# Patient Record
Sex: FEMALE | Race: BLACK OR AFRICAN AMERICAN | NOT HISPANIC OR LATINO | URBAN - METROPOLITAN AREA
[De-identification: names, ages, dates, MRNs, and addresses within clinical notes are randomized per-mention and may not be internally consistent; named-entity substitution may affect disease eponyms.]

---

## 2023-02-16 PROBLEM — M54.50 LOW BACK PAIN: Status: ACTIVE | Noted: 2023-02-16

## 2023-02-16 PROBLEM — R15.9 INCONTINENCE OF FECES WITH FECAL URGENCY: Status: ACTIVE | Noted: 2023-02-16

## 2023-02-16 PROBLEM — E11.9 DIABETES MELLITUS (MULTI): Status: ACTIVE | Noted: 2023-02-16

## 2023-02-16 PROBLEM — K59.00 CONSTIPATION: Status: ACTIVE | Noted: 2023-02-16

## 2023-02-16 PROBLEM — E55.9 VITAMIN D DEFICIENCY: Status: ACTIVE | Noted: 2023-02-16

## 2023-02-16 PROBLEM — R46.89 BEHAVIOR CONCERN: Status: ACTIVE | Noted: 2023-02-16

## 2023-02-16 PROBLEM — G35 MULTIPLE SCLEROSIS (MULTI): Status: ACTIVE | Noted: 2023-02-16

## 2023-02-16 PROBLEM — R06.83 SNORING: Status: ACTIVE | Noted: 2023-02-16

## 2023-02-16 PROBLEM — M79.605 PAIN OF LEFT LOWER EXTREMITY: Status: ACTIVE | Noted: 2023-02-16

## 2023-02-16 PROBLEM — E78.5 DYSLIPIDEMIA: Status: ACTIVE | Noted: 2023-02-16

## 2023-02-16 PROBLEM — E11.9 TYPE II DIABETES MELLITUS (MULTI): Status: ACTIVE | Noted: 2023-02-16

## 2023-02-16 PROBLEM — R09.81 CONGESTION OF NASAL SINUS: Status: ACTIVE | Noted: 2023-02-16

## 2023-02-16 PROBLEM — E66.9 OBESITY (BMI 30.0-34.9): Status: ACTIVE | Noted: 2023-02-16

## 2023-02-16 PROBLEM — R14.0 ABDOMINAL BLOATING: Status: ACTIVE | Noted: 2023-02-16

## 2023-02-16 PROBLEM — R15.2 INCONTINENCE OF FECES WITH FECAL URGENCY: Status: ACTIVE | Noted: 2023-02-16

## 2023-02-16 PROBLEM — D64.9 ANEMIA: Status: ACTIVE | Noted: 2023-02-16

## 2023-02-16 PROBLEM — E66.811 OBESITY (BMI 30.0-34.9): Status: ACTIVE | Noted: 2023-02-16

## 2023-02-16 PROBLEM — R53.83 FATIGUE: Status: ACTIVE | Noted: 2023-02-16

## 2023-02-16 PROBLEM — G47.33 OSA (OBSTRUCTIVE SLEEP APNEA): Status: ACTIVE | Noted: 2023-02-16

## 2023-02-16 PROBLEM — E73.9 LACTOSE INTOLERANCE: Status: ACTIVE | Noted: 2023-02-16
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
## 2023-02-16 PROBLEM — J31.0 CHRONIC RHINITIS: Status: ACTIVE | Noted: 2023-02-16
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
## 2023-02-16 PROBLEM — G47.26 CIRCADIAN RHYTHM SLEEP DISORDER, SHIFT WORK TYPE: Status: ACTIVE | Noted: 2023-02-16

## 2023-02-16 PROBLEM — I10 BENIGN ESSENTIAL HYPERTENSION: Status: ACTIVE | Noted: 2023-02-16

## 2023-02-16 PROBLEM — G47.10 HYPERSOMNIA: Status: ACTIVE | Noted: 2023-02-16

## 2023-02-16 PROBLEM — M25.512 LEFT SHOULDER PAIN: Status: ACTIVE | Noted: 2023-02-16

## 2023-02-16 PROBLEM — S99.921A TOE INJURY, RIGHT, INITIAL ENCOUNTER: Status: ACTIVE | Noted: 2023-02-16

## 2023-02-16 PROBLEM — R10.10 UPPER ABDOMINAL PAIN: Status: ACTIVE | Noted: 2023-02-16

## 2023-02-16 PROBLEM — H61.21 IMPACTED CERUMEN OF RIGHT EAR: Status: ACTIVE | Noted: 2023-02-16

## 2023-02-16 PROBLEM — M75.82 TENDINITIS OF LEFT ROTATOR CUFF: Status: ACTIVE | Noted: 2023-02-16

## 2023-02-16 RX ORDER — METOPROLOL SUCCINATE 100 MG/1
1 TABLET, EXTENDED RELEASE ORAL DAILY
COMMUNITY
Start: 2018-07-18 | End: 2023-04-19 | Stop reason: SDUPTHER

## 2023-02-16 RX ORDER — DAPAGLIFLOZIN 10 MG/1
1 TABLET, FILM COATED ORAL EVERY MORNING
COMMUNITY
Start: 2022-07-29 | End: 2023-04-25 | Stop reason: SDUPTHER

## 2023-02-16 RX ORDER — METFORMIN HYDROCHLORIDE 1000 MG/1
1 TABLET ORAL DAILY
COMMUNITY
Start: 2016-11-14 | End: 2023-04-19 | Stop reason: SDUPTHER

## 2023-02-16 RX ORDER — GLIPIZIDE 5 MG/1
2 TABLET ORAL
COMMUNITY
Start: 2016-10-11 | End: 2023-05-24 | Stop reason: SDUPTHER

## 2023-02-16 RX ORDER — AMLODIPINE BESYLATE 10 MG/1
1 TABLET ORAL DAILY
COMMUNITY
Start: 2015-11-14 | End: 2023-03-28 | Stop reason: SDUPTHER

## 2023-02-16 RX ORDER — CALCIUM CITRATE/VITAMIN D3 200MG-6.25
TABLET ORAL
COMMUNITY
Start: 2018-08-08 | End: 2023-09-15 | Stop reason: ALTCHOICE

## 2023-02-16 RX ORDER — ATORVASTATIN CALCIUM 20 MG/1
1 TABLET, FILM COATED ORAL DAILY
COMMUNITY
Start: 2016-11-14 | End: 2023-04-25 | Stop reason: SDUPTHER

## 2023-02-16 RX ORDER — SPIRONOLACTONE AND HYDROCHLOROTHIAZIDE 25; 25 MG/1; MG/1
1 TABLET ORAL DAILY
COMMUNITY
Start: 2018-07-18 | End: 2023-05-11 | Stop reason: SDUPTHER

## 2023-02-16 RX ORDER — LANCETS
EACH MISCELLANEOUS
COMMUNITY

## 2023-02-16 RX ORDER — LOSARTAN POTASSIUM 50 MG/1
1 TABLET ORAL DAILY
COMMUNITY
Start: 2020-06-09 | End: 2023-04-25 | Stop reason: SDUPTHER

## 2023-03-28 DIAGNOSIS — I10 BENIGN ESSENTIAL HYPERTENSION: Primary | ICD-10-CM

## 2023-03-28 RX ORDER — AMLODIPINE BESYLATE 10 MG/1
10 TABLET ORAL DAILY
Qty: 30 TABLET | Refills: 0 | Status: SHIPPED | OUTPATIENT
Start: 2023-03-28 | End: 2023-09-15 | Stop reason: SDUPTHER

## 2023-03-30 ENCOUNTER — APPOINTMENT (OUTPATIENT)
Dept: LAB | Facility: LAB | Age: 58
End: 2023-03-30
Payer: COMMERCIAL

## 2023-03-30 ENCOUNTER — OFFICE VISIT (OUTPATIENT)
Dept: PRIMARY CARE | Facility: CLINIC | Age: 58
End: 2023-03-30
Payer: COMMERCIAL

## 2023-03-30 VITALS
BODY MASS INDEX: 42.12 KG/M2 | HEIGHT: 63 IN | WEIGHT: 237.7 LBS | SYSTOLIC BLOOD PRESSURE: 112 MMHG | HEART RATE: 68 BPM | OXYGEN SATURATION: 98 % | DIASTOLIC BLOOD PRESSURE: 70 MMHG

## 2023-03-30 DIAGNOSIS — E55.9 VITAMIN D DEFICIENCY: ICD-10-CM

## 2023-03-30 DIAGNOSIS — E11.9 TYPE 2 DIABETES MELLITUS WITHOUT COMPLICATION, UNSPECIFIED WHETHER LONG TERM INSULIN USE (MULTI): Primary | ICD-10-CM

## 2023-03-30 DIAGNOSIS — Z12.31 ENCOUNTER FOR SCREENING MAMMOGRAM FOR BREAST CANCER: ICD-10-CM

## 2023-03-30 DIAGNOSIS — E78.5 DYSLIPIDEMIA: ICD-10-CM

## 2023-03-30 DIAGNOSIS — R53.83 FATIGUE, UNSPECIFIED TYPE: ICD-10-CM

## 2023-03-30 DIAGNOSIS — I10 BENIGN ESSENTIAL HYPERTENSION: ICD-10-CM

## 2023-03-30 DIAGNOSIS — G35 MULTIPLE SCLEROSIS (MULTI): ICD-10-CM

## 2023-03-30 PROBLEM — H61.21 IMPACTED CERUMEN OF RIGHT EAR: Status: RESOLVED | Noted: 2023-02-16 | Resolved: 2023-03-30

## 2023-03-30 LAB
CHOLESTEROL (MG/DL) IN SER/PLAS: 165 MG/DL (ref 0–199)
CHOLESTEROL IN HDL (MG/DL) IN SER/PLAS: 40.4 MG/DL
CHOLESTEROL/HDL RATIO: 4.1
LDL: 99 MG/DL (ref 0–99)
TRIGLYCERIDE (MG/DL) IN SER/PLAS: 127 MG/DL (ref 0–149)
VLDL: 25 MG/DL (ref 0–40)

## 2023-03-30 PROCEDURE — 3074F SYST BP LT 130 MM HG: CPT | Performed by: PHYSICIAN ASSISTANT

## 2023-03-30 PROCEDURE — 80061 LIPID PANEL: CPT

## 2023-03-30 PROCEDURE — 1036F TOBACCO NON-USER: CPT | Performed by: PHYSICIAN ASSISTANT

## 2023-03-30 PROCEDURE — 36415 COLL VENOUS BLD VENIPUNCTURE: CPT

## 2023-03-30 PROCEDURE — 4010F ACE/ARB THERAPY RXD/TAKEN: CPT | Performed by: PHYSICIAN ASSISTANT

## 2023-03-30 PROCEDURE — 99214 OFFICE O/P EST MOD 30 MIN: CPT | Performed by: PHYSICIAN ASSISTANT

## 2023-03-30 PROCEDURE — 3078F DIAST BP <80 MM HG: CPT | Performed by: PHYSICIAN ASSISTANT

## 2023-03-30 ASSESSMENT — PATIENT HEALTH QUESTIONNAIRE - PHQ9
SUM OF ALL RESPONSES TO PHQ9 QUESTIONS 1 AND 2: 0
1. LITTLE INTEREST OR PLEASURE IN DOING THINGS: NOT AT ALL
2. FEELING DOWN, DEPRESSED OR HOPELESS: NOT AT ALL

## 2023-03-30 NOTE — PROGRESS NOTES
"Subjective   Shola Osorio is a 57 y.o. female who presents for Follow-up (Needs referral to neuro).  HPI 57-year-old female presenting as a new patient for establishment of care.  Former patient of colleague, Dr. Hogan, last seen 12/29/2022.    HTN: Compliant with losartan 50 mg, metoprolol succinate 100 mg, spironolactone-HCTZ 25-25 mg.  She does not monitor her BP at home.  Denies any headache, dizziness, chest pain, SOB/DUPONT, LE edema.    T2DM: Uncontrolled on metformin 1000 mg twice daily, glipizide 5 mg 2 tabs twice daily, Farxiga 10 mg.  She does not monitor BG levels at home. Last hemoglobin A1c 7/29/2022 was 10.6%.  Last urine albumin 2020, ordered.  Last diabetic eye exam: >1 year ago.  Left diabetic foot exam: unknown, but she does check her feet regularly for ulcers or sores.  She does not follow with endocrinology.    MS: Not following with Neurology currently, needs referral.  Was previously following with neurology at [facility], not seen for several years.  She states she was initially diagnosed with MS in 2014 by Dr. Thompson.  Currently not on any medications.  Unsure of last MRI brain, several years ago.  She is currently complaining of scalp paresthesias, which have been occurring over the past 6 months or so.  She notes discomfort with combing her hair due to the pain.  No jaw pain, ear pain, or rashes.    Health maintenance:  Immunizations:  -Flu: Deferred to fall 2023  -COVID: received 3 doses   -Pneumococcal: Recommended due to history of T2DM, will schedule   -Shingrix: Recommended, will consider  Mammogram UTD (last 8/22/2022) - ordered 3/30/2023  Colon CA screening UTD (last 6/10/2019) -10 years  PAP UTD (last 10/20/2022)   CT cardiac scoring due (none prior) - ordered 3/30/2023    /70   Pulse 68   Ht 1.6 m (5' 3\")   Wt 108 kg (237 lb 11.2 oz)   SpO2 98%   BMI 42.11 kg/m²   Objective   Physical Exam  Vitals reviewed.   Constitutional:       General: She is not in acute " distress.     Appearance: Normal appearance. She is not ill-appearing.   HENT:      Head: Normocephalic and atraumatic.   Eyes:      General: No scleral icterus.     Extraocular Movements: Extraocular movements intact.      Conjunctiva/sclera: Conjunctivae normal.      Pupils: Pupils are equal, round, and reactive to light.   Cardiovascular:      Rate and Rhythm: Normal rate and regular rhythm.      Heart sounds: Normal heart sounds. No murmur heard.     No friction rub. No gallop.   Pulmonary:      Effort: Pulmonary effort is normal. No respiratory distress.      Breath sounds: Normal breath sounds. No stridor. No wheezing, rhonchi or rales.   Musculoskeletal:      Cervical back: Normal range of motion.      Right lower leg: No edema.      Left lower leg: No edema.   Skin:     General: Skin is warm and dry.   Neurological:      Mental Status: She is alert and oriented to person, place, and time. Mental status is at baseline.      Cranial Nerves: No cranial nerve deficit.      Gait: Gait normal.   Psychiatric:         Mood and Affect: Mood normal.         Behavior: Behavior normal.         Assessment/Plan   Problem List Items Addressed This Visit       Benign essential hypertension     Well-controlled.  Continue amlodipine 10 mg, losartan 50 mg, metoprolol succinate 100 mg, spironolactone-HCTZ 25-25 mg.  Encourage strict DASH diet and regular exercise         Relevant Orders    Comprehensive metabolic panel    CBC and Auto Differential    CT cardiac scoring wo IV contrast    Diabetes mellitus (CMS/HCC) - Primary     Uncontrolled on metformin 1000 mg twice daily, glipizide 5 mg, Farxiga 10 mg.  Hemoglobin A1c and urine albumin ordered.  Encouraged patient to begin checking BG levels 1-2 times daily.  Schedule for diabetic foot and eye exams.  We will consider Mounjaro, pending insurance coverage if diabetes is still uncontrolled         Relevant Orders    Hemoglobin A1c    Albumin, urine, random    Referral to  Ophthalmology    CT cardiac scoring wo IV contrast    Referral to Podiatry    Dyslipidemia     Continue atorvastatin 20 mg daily.  Lipid panel and CT cardiac scoring ordered.  Encourage Mediterranean-style diet regular exercise.         Relevant Orders    Lipid panel (Completed)    CT cardiac scoring wo IV contrast    Fatigue    Relevant Orders    Tsh With Reflex To Free T4 If Abnormal    Multiple sclerosis (CMS/HCC)     Previously followed with [facility], will be referred to  neurology.  Last MRI of the brain several years ago, will order repeat MRI brain with and without contrast.  Encourage patient to call insurance prior to completing MRI of the brain to assess coverage         Relevant Orders    Referral to Neurology    MR brain w and wo IV contrast    Vitamin D deficiency    Relevant Orders    Vitamin D 25-Hydroxy,Total     Other Visit Diagnoses       Encounter for screening mammogram for breast cancer        Relevant Orders    BI mammo bilateral screening tomosynthesis

## 2023-03-30 NOTE — ASSESSMENT & PLAN NOTE
Uncontrolled on metformin 1000 mg twice daily, glipizide 5 mg, Farxiga 10 mg.  Hemoglobin A1c and urine albumin ordered.  Encouraged patient to begin checking BG levels 1-2 times daily.  Schedule for diabetic foot and eye exams.  We will consider Mounjaro, pending insurance coverage if diabetes is still uncontrolled

## 2023-03-30 NOTE — ASSESSMENT & PLAN NOTE
Previously followed with [facility], will be referred to  neurology.  Last MRI of the brain several years ago, will order repeat MRI brain with and without contrast.  Encourage patient to call insurance prior to completing MRI of the brain to assess coverage

## 2023-03-31 ENCOUNTER — CLINICAL SUPPORT (OUTPATIENT)
Dept: PRIMARY CARE | Facility: CLINIC | Age: 58
End: 2023-03-31
Payer: COMMERCIAL

## 2023-03-31 DIAGNOSIS — Z23 NEED FOR PNEUMOCOCCAL VACCINATION: ICD-10-CM

## 2023-03-31 PROCEDURE — 90677 PCV20 VACCINE IM: CPT | Performed by: PHYSICIAN ASSISTANT

## 2023-03-31 PROCEDURE — 90471 IMMUNIZATION ADMIN: CPT | Performed by: PHYSICIAN ASSISTANT

## 2023-03-31 NOTE — ASSESSMENT & PLAN NOTE
Continue atorvastatin 20 mg daily.  Lipid panel and CT cardiac scoring ordered.  Encourage Mediterranean-style diet regular exercise.

## 2023-03-31 NOTE — ASSESSMENT & PLAN NOTE
Well-controlled.  Continue amlodipine 10 mg, losartan 50 mg, metoprolol succinate 100 mg, spironolactone-HCTZ 25-25 mg.  Encourage strict DASH diet and regular exercise

## 2023-04-06 ENCOUNTER — TELEPHONE (OUTPATIENT)
Dept: PRIMARY CARE | Facility: CLINIC | Age: 58
End: 2023-04-06
Payer: COMMERCIAL

## 2023-04-06 NOTE — RESULT ENCOUNTER NOTE
A lipid panel was drawn, which overall looks good. She should continue the Atorvastatin 20mg and continue working on a heart healthy lifestyle through diet and exercise.     Unfortunately it appears that the lab did not draw all of the labs or the urine albumin that I had ordered. I am not sure why this is the case. The order for the remainder of the labs and urine test are still in the system if she has the opportunity to go back and complete them. They are not fasting labs. Sorry about the inconvenience!

## 2023-04-06 NOTE — TELEPHONE ENCOUNTER
Pt informed of results.    ----- Message from Carri Mcmanus PA-C sent at 4/5/2023  9:12 PM EDT -----  A lipid panel was drawn, which overall looks good. She should continue the Atorvastatin 20mg and continue working on a heart healthy lifestyle through diet and exercise.     Unfortunately it appears that the lab did not draw all of the labs or the urine albumin that I had ordered. I am not sure why this is the case. The order for the remainder of the labs and urine test are still in the system if she has the opportunity to go back and complete them. They are not fasting labs. Sorry about the inconvenience!

## 2023-04-12 ENCOUNTER — APPOINTMENT (OUTPATIENT)
Dept: LAB | Facility: LAB | Age: 58
End: 2023-04-12
Payer: COMMERCIAL

## 2023-04-19 DIAGNOSIS — I10 BENIGN ESSENTIAL HYPERTENSION: ICD-10-CM

## 2023-04-19 DIAGNOSIS — E11.9 TYPE 2 DIABETES MELLITUS WITHOUT COMPLICATION, UNSPECIFIED WHETHER LONG TERM INSULIN USE (MULTI): Primary | ICD-10-CM

## 2023-04-19 RX ORDER — METFORMIN HYDROCHLORIDE 1000 MG/1
1000 TABLET ORAL DAILY
Qty: 30 TABLET | Refills: 5 | Status: SHIPPED | OUTPATIENT
Start: 2023-04-19 | End: 2023-09-15 | Stop reason: SDUPTHER

## 2023-04-19 RX ORDER — METOPROLOL SUCCINATE 100 MG/1
100 TABLET, EXTENDED RELEASE ORAL DAILY
Qty: 30 TABLET | Refills: 5 | Status: SHIPPED | OUTPATIENT
Start: 2023-04-19 | End: 2023-09-15 | Stop reason: SDUPTHER

## 2023-04-21 ENCOUNTER — TELEPHONE (OUTPATIENT)
Dept: PRIMARY CARE | Facility: CLINIC | Age: 58
End: 2023-04-21
Payer: COMMERCIAL

## 2023-04-21 DIAGNOSIS — E78.5 DYSLIPIDEMIA: ICD-10-CM

## 2023-04-21 DIAGNOSIS — Z79.4 TYPE 2 DIABETES MELLITUS WITHOUT COMPLICATION, WITH LONG-TERM CURRENT USE OF INSULIN (MULTI): ICD-10-CM

## 2023-04-21 DIAGNOSIS — I10 BENIGN ESSENTIAL HYPERTENSION: ICD-10-CM

## 2023-04-21 DIAGNOSIS — E11.9 TYPE 2 DIABETES MELLITUS WITHOUT COMPLICATION, WITH LONG-TERM CURRENT USE OF INSULIN (MULTI): ICD-10-CM

## 2023-04-25 RX ORDER — DAPAGLIFLOZIN 10 MG/1
10 TABLET, FILM COATED ORAL EVERY MORNING
Qty: 90 TABLET | Refills: 2 | Status: SHIPPED | OUTPATIENT
Start: 2023-04-25 | End: 2024-04-29 | Stop reason: SDUPTHER

## 2023-04-25 RX ORDER — LOSARTAN POTASSIUM 50 MG/1
50 TABLET ORAL DAILY
Qty: 90 TABLET | Refills: 1 | Status: SHIPPED | OUTPATIENT
Start: 2023-04-25 | End: 2023-09-15 | Stop reason: SDUPTHER

## 2023-04-25 RX ORDER — ATORVASTATIN CALCIUM 20 MG/1
20 TABLET, FILM COATED ORAL DAILY
Qty: 30 TABLET | Refills: 2 | Status: SHIPPED | OUTPATIENT
Start: 2023-04-25 | End: 2023-07-19

## 2023-05-11 DIAGNOSIS — I10 BENIGN ESSENTIAL HYPERTENSION: ICD-10-CM

## 2023-05-11 RX ORDER — SPIRONOLACTONE AND HYDROCHLOROTHIAZIDE 25; 25 MG/1; MG/1
1 TABLET ORAL DAILY
Qty: 90 TABLET | Refills: 1 | Status: SHIPPED | OUTPATIENT
Start: 2023-05-11 | End: 2023-09-15 | Stop reason: SDUPTHER

## 2023-05-24 DIAGNOSIS — E11.9 TYPE 2 DIABETES MELLITUS WITHOUT COMPLICATION, WITHOUT LONG-TERM CURRENT USE OF INSULIN (MULTI): Primary | ICD-10-CM

## 2023-05-24 RX ORDER — GLIPIZIDE 5 MG/1
10 TABLET ORAL
Qty: 120 TABLET | Refills: 2 | Status: SHIPPED | OUTPATIENT
Start: 2023-05-24 | End: 2023-08-21

## 2023-06-29 PROBLEM — I10 HTN (HYPERTENSION): Status: ACTIVE | Noted: 2023-06-29

## 2023-07-05 ENCOUNTER — LAB (OUTPATIENT)
Dept: LAB | Facility: LAB | Age: 58
End: 2023-07-05
Payer: COMMERCIAL

## 2023-07-05 DIAGNOSIS — E11.9 TYPE 2 DIABETES MELLITUS WITHOUT COMPLICATION, UNSPECIFIED WHETHER LONG TERM INSULIN USE (MULTI): ICD-10-CM

## 2023-07-05 DIAGNOSIS — I10 BENIGN ESSENTIAL HYPERTENSION: ICD-10-CM

## 2023-07-05 DIAGNOSIS — R53.83 FATIGUE, UNSPECIFIED TYPE: ICD-10-CM

## 2023-07-05 DIAGNOSIS — E55.9 VITAMIN D DEFICIENCY: ICD-10-CM

## 2023-07-05 LAB
ALANINE AMINOTRANSFERASE (SGPT) (U/L) IN SER/PLAS: 21 U/L (ref 7–45)
ALBUMIN (G/DL) IN SER/PLAS: 3.9 G/DL (ref 3.4–5)
ALBUMIN (MG/L) IN URINE: <7 MG/L
ALBUMIN/CREATININE (UG/MG) IN URINE: NORMAL UG/MG CRT (ref 0–30)
ALKALINE PHOSPHATASE (U/L) IN SER/PLAS: 55 U/L (ref 33–110)
ANION GAP IN SER/PLAS: 14 MMOL/L (ref 10–20)
ASPARTATE AMINOTRANSFERASE (SGOT) (U/L) IN SER/PLAS: 20 U/L (ref 9–39)
BASOPHILS (10*3/UL) IN BLOOD BY AUTOMATED COUNT: 0.04 X10E9/L (ref 0–0.1)
BASOPHILS/100 LEUKOCYTES IN BLOOD BY AUTOMATED COUNT: 0.5 % (ref 0–2)
BILIRUBIN TOTAL (MG/DL) IN SER/PLAS: 0.5 MG/DL (ref 0–1.2)
CALCIDIOL (25 OH VITAMIN D3) (NG/ML) IN SER/PLAS: 34 NG/ML
CALCIUM (MG/DL) IN SER/PLAS: 9.3 MG/DL (ref 8.6–10.6)
CARBON DIOXIDE, TOTAL (MMOL/L) IN SER/PLAS: 28 MMOL/L (ref 21–32)
CHLORIDE (MMOL/L) IN SER/PLAS: 100 MMOL/L (ref 98–107)
CREATININE (MG/DL) IN SER/PLAS: 0.97 MG/DL (ref 0.5–1.05)
CREATININE (MG/DL) IN URINE: 75.5 MG/DL (ref 20–320)
EOSINOPHILS (10*3/UL) IN BLOOD BY AUTOMATED COUNT: 0.18 X10E9/L (ref 0–0.7)
EOSINOPHILS/100 LEUKOCYTES IN BLOOD BY AUTOMATED COUNT: 2.5 % (ref 0–6)
ERYTHROCYTE DISTRIBUTION WIDTH (RATIO) BY AUTOMATED COUNT: 15.2 % (ref 11.5–14.5)
ERYTHROCYTE MEAN CORPUSCULAR HEMOGLOBIN CONCENTRATION (G/DL) BY AUTOMATED: 29.1 G/DL (ref 32–36)
ERYTHROCYTE MEAN CORPUSCULAR VOLUME (FL) BY AUTOMATED COUNT: 75 FL (ref 80–100)
ERYTHROCYTES (10*6/UL) IN BLOOD BY AUTOMATED COUNT: 5.74 X10E12/L (ref 4–5.2)
ESTIMATED AVERAGE GLUCOSE FOR HBA1C: 235 MG/DL
GFR FEMALE: 67 ML/MIN/1.73M2
GLUCOSE (MG/DL) IN SER/PLAS: 270 MG/DL (ref 74–99)
HEMATOCRIT (%) IN BLOOD BY AUTOMATED COUNT: 43.3 % (ref 36–46)
HEMOGLOBIN (G/DL) IN BLOOD: 12.6 G/DL (ref 12–16)
HEMOGLOBIN A1C/HEMOGLOBIN TOTAL IN BLOOD: 9.8 %
IMMATURE GRANULOCYTES/100 LEUKOCYTES IN BLOOD BY AUTOMATED COUNT: 0.1 % (ref 0–0.9)
LEUKOCYTES (10*3/UL) IN BLOOD BY AUTOMATED COUNT: 7.3 X10E9/L (ref 4.4–11.3)
LYMPHOCYTES (10*3/UL) IN BLOOD BY AUTOMATED COUNT: 2.85 X10E9/L (ref 1.2–4.8)
LYMPHOCYTES/100 LEUKOCYTES IN BLOOD BY AUTOMATED COUNT: 38.8 % (ref 13–44)
MONOCYTES (10*3/UL) IN BLOOD BY AUTOMATED COUNT: 0.74 X10E9/L (ref 0.1–1)
MONOCYTES/100 LEUKOCYTES IN BLOOD BY AUTOMATED COUNT: 10.1 % (ref 2–10)
NEUTROPHILS (10*3/UL) IN BLOOD BY AUTOMATED COUNT: 3.52 X10E9/L (ref 1.2–7.7)
NEUTROPHILS/100 LEUKOCYTES IN BLOOD BY AUTOMATED COUNT: 48 % (ref 40–80)
NRBC (PER 100 WBCS) BY AUTOMATED COUNT: 0 /100 WBC (ref 0–0)
PLATELETS (10*3/UL) IN BLOOD AUTOMATED COUNT: 257 X10E9/L (ref 150–450)
POTASSIUM (MMOL/L) IN SER/PLAS: 3.8 MMOL/L (ref 3.5–5.3)
PROTEIN TOTAL: 7.6 G/DL (ref 6.4–8.2)
SODIUM (MMOL/L) IN SER/PLAS: 138 MMOL/L (ref 136–145)
THYROTROPIN (MIU/L) IN SER/PLAS BY DETECTION LIMIT <= 0.05 MIU/L: 0.32 MIU/L (ref 0.44–3.98)
THYROXINE (T4) FREE (NG/DL) IN SER/PLAS: 1.13 NG/DL (ref 0.78–1.48)
UREA NITROGEN (MG/DL) IN SER/PLAS: 16 MG/DL (ref 6–23)

## 2023-07-05 PROCEDURE — 80053 COMPREHEN METABOLIC PANEL: CPT

## 2023-07-05 PROCEDURE — 82306 VITAMIN D 25 HYDROXY: CPT

## 2023-07-05 PROCEDURE — 36415 COLL VENOUS BLD VENIPUNCTURE: CPT

## 2023-07-05 PROCEDURE — 82043 UR ALBUMIN QUANTITATIVE: CPT

## 2023-07-05 PROCEDURE — 85025 COMPLETE CBC W/AUTO DIFF WBC: CPT

## 2023-07-05 PROCEDURE — 82570 ASSAY OF URINE CREATININE: CPT

## 2023-07-05 PROCEDURE — 84443 ASSAY THYROID STIM HORMONE: CPT

## 2023-07-05 PROCEDURE — 83036 HEMOGLOBIN GLYCOSYLATED A1C: CPT

## 2023-07-05 PROCEDURE — 84439 ASSAY OF FREE THYROXINE: CPT

## 2023-07-10 DIAGNOSIS — R53.83 FATIGUE, UNSPECIFIED TYPE: Primary | ICD-10-CM

## 2023-07-11 NOTE — RESULT ENCOUNTER NOTE
Lab results are back.  All look good except    Thyroid levels are slightly abnormal.  Repeat levels in 4 to 6 weeks, go to the lab    Hemoglobin A1c decreased 9.8%, which is a step in the right direction but it is still above goal of <7%.  I recommend we consider adding on either Rybelsus (daily oral medication) or Ozempic (weekly injectable medication) to assist with further control of A1c and some weight loss.  Please let me know if she is willing to begin either and I will call in the pharmacy    Vitamin D level is in the low normal range.  Take over-the-counter vitamin D3 2000 units daily with food

## 2023-07-19 DIAGNOSIS — E78.5 DYSLIPIDEMIA: ICD-10-CM

## 2023-07-19 RX ORDER — ATORVASTATIN CALCIUM 20 MG/1
TABLET, FILM COATED ORAL
Qty: 30 TABLET | Refills: 2 | Status: SHIPPED | OUTPATIENT
Start: 2023-07-19 | End: 2023-09-15 | Stop reason: SDUPTHER

## 2023-08-20 DIAGNOSIS — E11.9 TYPE 2 DIABETES MELLITUS WITHOUT COMPLICATION, WITHOUT LONG-TERM CURRENT USE OF INSULIN (MULTI): ICD-10-CM

## 2023-08-21 RX ORDER — GLIPIZIDE 5 MG/1
10 TABLET ORAL
Qty: 120 TABLET | Refills: 0 | Status: SHIPPED | OUTPATIENT
Start: 2023-08-21 | End: 2023-09-15 | Stop reason: SDUPTHER

## 2023-09-13 ENCOUNTER — OFFICE VISIT (OUTPATIENT)
Dept: PRIMARY CARE | Facility: CLINIC | Age: 58
End: 2023-09-13
Payer: COMMERCIAL

## 2023-09-13 VITALS
SYSTOLIC BLOOD PRESSURE: 118 MMHG | OXYGEN SATURATION: 96 % | WEIGHT: 236 LBS | HEART RATE: 64 BPM | BODY MASS INDEX: 41.81 KG/M2 | DIASTOLIC BLOOD PRESSURE: 74 MMHG

## 2023-09-13 DIAGNOSIS — I10 BENIGN ESSENTIAL HYPERTENSION: ICD-10-CM

## 2023-09-13 DIAGNOSIS — E78.2 MIXED HYPERLIPIDEMIA: ICD-10-CM

## 2023-09-13 DIAGNOSIS — G35 MULTIPLE SCLEROSIS (MULTI): ICD-10-CM

## 2023-09-13 DIAGNOSIS — R53.83 FATIGUE, UNSPECIFIED TYPE: ICD-10-CM

## 2023-09-13 DIAGNOSIS — E11.9 TYPE 2 DIABETES MELLITUS WITHOUT COMPLICATION, WITHOUT LONG-TERM CURRENT USE OF INSULIN (MULTI): Primary | ICD-10-CM

## 2023-09-13 PROCEDURE — 1036F TOBACCO NON-USER: CPT | Performed by: PHYSICIAN ASSISTANT

## 2023-09-13 PROCEDURE — 3074F SYST BP LT 130 MM HG: CPT | Performed by: PHYSICIAN ASSISTANT

## 2023-09-13 PROCEDURE — 3046F HEMOGLOBIN A1C LEVEL >9.0%: CPT | Performed by: PHYSICIAN ASSISTANT

## 2023-09-13 PROCEDURE — 99214 OFFICE O/P EST MOD 30 MIN: CPT | Performed by: PHYSICIAN ASSISTANT

## 2023-09-13 PROCEDURE — 4010F ACE/ARB THERAPY RXD/TAKEN: CPT | Performed by: PHYSICIAN ASSISTANT

## 2023-09-13 PROCEDURE — 3078F DIAST BP <80 MM HG: CPT | Performed by: PHYSICIAN ASSISTANT

## 2023-09-13 NOTE — PROGRESS NOTES
Subjective   Patient ID: Shola Osorio is a 57 y.o. female who presents for Follow-up and look at the back of neck     HPI 58yo female presenting for a follow up. Overall doing well. Currently no complaints.    HTN: Compliant with losartan 50 mg, metoprolol succinate 100 mg, spironolactone-HCTZ 25-25 mg and amlodipine 10 mg.  She does monitor her BP at home. Doesn't check regularly. Run around ~110/80-90 .  Denies any headache, dizziness, chest pain, SOB/DUPONT LE edema.    HLD: Stable on atorvastatin 20 mg.  ASCVD 10-year risk is 11.4%     T2DM: Uncontrolled on metformin 1000 mg daily, glipizide 5 mg 2 tabs twice daily, Farxiga 10 mg.  She does monitor BG levels at home. Does not check regularly. Running ~170s. Last hemoglobin A1c was 9.8% on 7/5/23.  Last urine albumin UTD, last 7/5/23. Last diabetic eye exam: in March or April 2023  Left diabetic foot exam: unknown, but she does check her feet regularly for ulcers or sores.  She does not follow with endocrinology.     MS: Not following with Neurology currently. Was scheduled 8/11/23 with Dr. Camara, but appt was cancelled.  Will reschedule. Was previously following with neurology at [facility], but has not been seen for several years.  She was initially diagnosed with MS in 2014 by Dr. Thompson. Currently not on any medications.  Last MRI brain was 4/20/23.  Currently, no headaches, numbness tingling, or neurological deficits.    Health maintenance:  Immunizations:  -Flu: next month   -Pneumococcal: UTD  -Shingrix: Recommended, will consider  -Tdap: UTD, last 2019  Mammogram UTD (last 8/22/2022) - ordered 3/30/2023, not completed yet  Colon CA screening UTD (last 6/10/2019) -10 years  PAP UTD (last 10/20/2022)   CT cardiac scoring UTD (4/1/23)    12 point ROS reviewed and negative other than as stated in HPI    Objective   /74   Pulse 64   Wt 107 kg (236 lb)   SpO2 96%   BMI 41.81 kg/m²     Physical Exam  Vitals reviewed.   Constitutional:        General: She is not in acute distress.     Appearance: Normal appearance. She is not ill-appearing.   HENT:      Head: Normocephalic and atraumatic.   Eyes:      General: No scleral icterus.     Extraocular Movements: Extraocular movements intact.      Conjunctiva/sclera: Conjunctivae normal.      Pupils: Pupils are equal, round, and reactive to light.   Cardiovascular:      Rate and Rhythm: Normal rate and regular rhythm.      Heart sounds: Normal heart sounds. No murmur heard.     No friction rub. No gallop.   Pulmonary:      Effort: Pulmonary effort is normal. No respiratory distress.      Breath sounds: Normal breath sounds. No stridor. No wheezing, rhonchi or rales.   Musculoskeletal:      Cervical back: Normal range of motion.      Right lower leg: No edema.      Left lower leg: No edema.   Skin:     General: Skin is warm and dry.   Neurological:      Mental Status: She is alert and oriented to person, place, and time. Mental status is at baseline.      Cranial Nerves: No cranial nerve deficit.      Gait: Gait normal.   Psychiatric:         Mood and Affect: Mood normal.         Behavior: Behavior normal.         Assessment/Plan   Problem List Items Addressed This Visit       Benign essential hypertension     Well-controlled.  Continue with losartan 50 mg, metoprolol succinate 100 mg, spironolactone-HCTZ 25-25 mg and amlodipine 10 mg. Maintain strict adherence to DASH diet and incorporate exercise into daily routine.          Relevant Medications    spironolacton-hydrochlorothiaz (Aldactazide) 25-25 mg tablet    metoprolol succinate XL (Toprol-XL) 100 mg 24 hr tablet    losartan (Cozaar) 50 mg tablet    amLODIPine (Norvasc) 10 mg tablet    Fatigue     TSH with reflex free T4 ordered         Relevant Orders    Tsh With Reflex To Free T4 If Abnormal    Multiple sclerosis (CMS/HCC)     Stable.  Schedule with neurology for further management         Hyperlipidemia     Continue atorvastatin 20 mg.  Consider 81 mg  ASA.  Lipid panel ordered.  Follow Mediterranean-style diet and exercise as tolerated         Relevant Medications    atorvastatin (Lipitor) 20 mg tablet    Diabetes mellitus, type 2 (CMS/HCC) - Primary     Diabetes is uncontrolled.  Continue metformin 1000 mg, glipizide 5 mg, and Farxiga 10 mg.  Continue monitoring glucose levels at home. Ordered A1C today. Begin checking blood sugars at home. Eat a healthy diet low in carbohydrates and added sugars.  Encouraged to schedule for diabetic foot and eye exams if not up-to-date         Relevant Medications    blood sugar diagnostic (True Metrix Glucose Test Strip) strip    metFORMIN (Glucophage) 1,000 mg tablet    glipiZIDE (Glucotrol) 5 mg tablet    Other Relevant Orders    Hemoglobin A1c    Comprehensive metabolic panel        Follow up in 3 to 4 months for CPE or sooner as needed    I saw and evaluated the patient.  I personally obtained the key and critical portions of the history and physical exam. I was also present for any critical care time or any procedures. I discussed the patient and reviewed the Physician Assistant student's documentation.  I agree with the Physician Assistant student's medical decision making as documented in the note.    LEN Guaman PA-C

## 2023-09-14 NOTE — ASSESSMENT & PLAN NOTE
Diabetes is uncontrolled.  Continue metformin 1000 mg, glipizide 5 mg, and Farxiga 10 mg.  Continue monitoring glucose levels at home. Ordered A1C today. Begin checking blood sugars at home. Eat a healthy diet low in carbohydrates and added sugars.  Encouraged to schedule for diabetic foot and eye exams if not up-to-date

## 2023-09-14 NOTE — ASSESSMENT & PLAN NOTE
Well-controlled.  Continue with losartan 50 mg, metoprolol succinate 100 mg, spironolactone-HCTZ 25-25 mg and amlodipine 10 mg. Maintain strict adherence to DASH diet and incorporate exercise into daily routine.

## 2023-09-15 PROBLEM — I10 HTN (HYPERTENSION): Status: RESOLVED | Noted: 2023-06-29 | Resolved: 2023-09-15

## 2023-09-15 RX ORDER — CALCIUM CITRATE/VITAMIN D3 200MG-6.25
TABLET ORAL
Start: 2023-09-15

## 2023-09-15 RX ORDER — METOPROLOL SUCCINATE 100 MG/1
100 TABLET, EXTENDED RELEASE ORAL DAILY
Qty: 90 TABLET | Refills: 1 | Status: SHIPPED | OUTPATIENT
Start: 2023-09-15 | End: 2024-04-15 | Stop reason: SDUPTHER

## 2023-09-15 RX ORDER — SPIRONOLACTONE AND HYDROCHLOROTHIAZIDE 25; 25 MG/1; MG/1
1 TABLET ORAL DAILY
Qty: 90 TABLET | Refills: 1 | Status: SHIPPED | OUTPATIENT
Start: 2023-09-15 | End: 2024-04-18 | Stop reason: SDUPTHER

## 2023-09-15 RX ORDER — GLIPIZIDE 5 MG/1
10 TABLET ORAL
Qty: 120 TABLET | Refills: 0 | Status: SHIPPED | OUTPATIENT
Start: 2023-09-15 | End: 2023-10-09

## 2023-09-15 RX ORDER — AMLODIPINE BESYLATE 10 MG/1
10 TABLET ORAL DAILY
Qty: 90 TABLET | Refills: 1 | Status: SHIPPED | OUTPATIENT
Start: 2023-09-15 | End: 2024-04-23 | Stop reason: SDUPTHER

## 2023-09-15 RX ORDER — ATORVASTATIN CALCIUM 20 MG/1
20 TABLET, FILM COATED ORAL DAILY
Qty: 90 TABLET | Refills: 1 | Status: SHIPPED | OUTPATIENT
Start: 2023-09-15

## 2023-09-15 RX ORDER — METFORMIN HYDROCHLORIDE 1000 MG/1
1000 TABLET ORAL DAILY
Qty: 90 TABLET | Refills: 1 | Status: SHIPPED | OUTPATIENT
Start: 2023-09-15 | End: 2024-03-25 | Stop reason: SDUPTHER

## 2023-09-15 RX ORDER — LOSARTAN POTASSIUM 50 MG/1
50 TABLET ORAL DAILY
Qty: 90 TABLET | Refills: 1 | Status: SHIPPED | OUTPATIENT
Start: 2023-09-15 | End: 2024-04-18 | Stop reason: SDUPTHER

## 2023-09-16 NOTE — ASSESSMENT & PLAN NOTE
Continue atorvastatin 20 mg.  Consider 81 mg ASA.  Lipid panel ordered.  Follow Mediterranean-style diet and exercise as tolerated

## 2023-10-08 DIAGNOSIS — E11.9 TYPE 2 DIABETES MELLITUS WITHOUT COMPLICATION, WITHOUT LONG-TERM CURRENT USE OF INSULIN (MULTI): ICD-10-CM

## 2023-10-09 ENCOUNTER — CLINICAL SUPPORT (OUTPATIENT)
Dept: PRIMARY CARE | Facility: CLINIC | Age: 58
End: 2023-10-09
Payer: COMMERCIAL

## 2023-10-09 ENCOUNTER — LAB (OUTPATIENT)
Dept: LAB | Facility: LAB | Age: 58
End: 2023-10-09
Payer: COMMERCIAL

## 2023-10-09 DIAGNOSIS — R53.83 FATIGUE, UNSPECIFIED TYPE: ICD-10-CM

## 2023-10-09 DIAGNOSIS — E11.9 TYPE 2 DIABETES MELLITUS WITHOUT COMPLICATION, WITHOUT LONG-TERM CURRENT USE OF INSULIN (MULTI): ICD-10-CM

## 2023-10-09 DIAGNOSIS — Z00.00 HEALTHCARE MAINTENANCE: Primary | ICD-10-CM

## 2023-10-09 LAB
ALBUMIN SERPL BCP-MCNC: 4.1 G/DL (ref 3.4–5)
ALP SERPL-CCNC: 60 U/L (ref 33–110)
ALT SERPL W P-5'-P-CCNC: 18 U/L (ref 7–45)
ANION GAP SERPL CALC-SCNC: 12 MMOL/L (ref 10–20)
AST SERPL W P-5'-P-CCNC: 15 U/L (ref 9–39)
BILIRUB SERPL-MCNC: 0.3 MG/DL (ref 0–1.2)
BUN SERPL-MCNC: 16 MG/DL (ref 6–23)
CALCIUM SERPL-MCNC: 9.5 MG/DL (ref 8.6–10.6)
CHLORIDE SERPL-SCNC: 101 MMOL/L (ref 98–107)
CO2 SERPL-SCNC: 32 MMOL/L (ref 21–32)
CREAT SERPL-MCNC: 0.86 MG/DL (ref 0.5–1.05)
EST. AVERAGE GLUCOSE BLD GHB EST-MCNC: 223 MG/DL
GFR SERPL CREATININE-BSD FRML MDRD: 78 ML/MIN/1.73M*2
GLUCOSE SERPL-MCNC: 152 MG/DL (ref 74–99)
HBA1C MFR BLD: 9.4 %
POTASSIUM SERPL-SCNC: 4.4 MMOL/L (ref 3.5–5.3)
PROT SERPL-MCNC: 7.9 G/DL (ref 6.4–8.2)
SODIUM SERPL-SCNC: 141 MMOL/L (ref 136–145)
TSH SERPL-ACNC: 1.05 MIU/L (ref 0.44–3.98)

## 2023-10-09 PROCEDURE — 36415 COLL VENOUS BLD VENIPUNCTURE: CPT

## 2023-10-09 PROCEDURE — 90471 IMMUNIZATION ADMIN: CPT | Performed by: PHYSICIAN ASSISTANT

## 2023-10-09 PROCEDURE — 90686 IIV4 VACC NO PRSV 0.5 ML IM: CPT | Performed by: PHYSICIAN ASSISTANT

## 2023-10-09 PROCEDURE — 84443 ASSAY THYROID STIM HORMONE: CPT

## 2023-10-09 PROCEDURE — 83036 HEMOGLOBIN GLYCOSYLATED A1C: CPT

## 2023-10-09 PROCEDURE — 80053 COMPREHEN METABOLIC PANEL: CPT

## 2023-10-09 RX ORDER — GLIPIZIDE 5 MG/1
10 TABLET ORAL
Qty: 360 TABLET | Refills: 0 | Status: SHIPPED | OUTPATIENT
Start: 2023-10-09 | End: 2024-04-18 | Stop reason: SDUPTHER

## 2023-10-18 NOTE — RESULT ENCOUNTER NOTE
Hemoglobin A1c decreased slightly to 9.4%, but is still above goal of <7%. I recommend we consider adding on either Rybelsus (daily oral medication) or Ozempic (weekly injectable medication) to assist with further control of A1c and some weight loss.  Please let me know if she is willing to begin either and I will call in the pharmacy    Remainder of labs look stable

## 2023-12-11 ENCOUNTER — OFFICE VISIT (OUTPATIENT)
Dept: PRIMARY CARE | Facility: CLINIC | Age: 58
End: 2023-12-11
Payer: COMMERCIAL

## 2023-12-11 ENCOUNTER — APPOINTMENT (OUTPATIENT)
Dept: LAB | Facility: LAB | Age: 58
End: 2023-12-11
Payer: COMMERCIAL

## 2023-12-11 VITALS
OXYGEN SATURATION: 96 % | WEIGHT: 236 LBS | BODY MASS INDEX: 41.81 KG/M2 | HEART RATE: 72 BPM | DIASTOLIC BLOOD PRESSURE: 82 MMHG | SYSTOLIC BLOOD PRESSURE: 121 MMHG

## 2023-12-11 DIAGNOSIS — E78.5 DYSLIPIDEMIA: ICD-10-CM

## 2023-12-11 DIAGNOSIS — I10 BENIGN ESSENTIAL HYPERTENSION: ICD-10-CM

## 2023-12-11 DIAGNOSIS — G35 MULTIPLE SCLEROSIS (MULTI): ICD-10-CM

## 2023-12-11 DIAGNOSIS — E11.9 TYPE 2 DIABETES MELLITUS WITHOUT COMPLICATION, WITHOUT LONG-TERM CURRENT USE OF INSULIN (MULTI): Primary | ICD-10-CM

## 2023-12-11 PROCEDURE — 4010F ACE/ARB THERAPY RXD/TAKEN: CPT | Performed by: PHYSICIAN ASSISTANT

## 2023-12-11 PROCEDURE — 99214 OFFICE O/P EST MOD 30 MIN: CPT | Performed by: PHYSICIAN ASSISTANT

## 2023-12-11 PROCEDURE — 3079F DIAST BP 80-89 MM HG: CPT | Performed by: PHYSICIAN ASSISTANT

## 2023-12-11 PROCEDURE — 1036F TOBACCO NON-USER: CPT | Performed by: PHYSICIAN ASSISTANT

## 2023-12-11 PROCEDURE — 3074F SYST BP LT 130 MM HG: CPT | Performed by: PHYSICIAN ASSISTANT

## 2023-12-11 PROCEDURE — 3046F HEMOGLOBIN A1C LEVEL >9.0%: CPT | Performed by: PHYSICIAN ASSISTANT

## 2023-12-11 NOTE — PROGRESS NOTES
Subjective   Patient ID: Shola Osorio is a 58 y.o. female who presents for 3month f/u, broke ankle   Overall doing well. Currently c/o:     Right ankle injury, closed avulsion fx of distal fibula: occurred 10/30/23. Wore a boot x 6 weeks.  Admits she has not been wearing her boot as directed, leading to delayed wound healing    HTN: Compliant with losartan 50 mg, metoprolol succinate 100 mg, spironolactone-HCTZ 25-25 mg and amlodipine 10 mg.  She does monitor her BP at home, runs 120/80s. States it was 140/90 3 days ago. Denies any HA, dizziness, chest pain, SOB/DUPONT LE edema.    HLD: Stable on atorvastatin 20 mg. ASCVD 10-year risk is 12.8%      T2DM: Uncontrolled on metformin 1000 mg daily, glipizide 5 mg 2 tabs twice daily (has missed some AM doses), Farxiga 10 mg. Last hemoglobin A1c was 9.4% 10/2023.  Last urine albumin UTD, last 7/5/23. Last diabetic eye exam: in March or April 2023. Last diabetic foot exam: unknown, but she does check her feet regularly for ulcers or sores.  She does not follow with endocrinology.     MS: Not following with Neurology currently. Was scheduled 8/11/23 with Dr. Camara, but appt was cancelled.  Will reschedule. Was previously following with neurology at [facility], but has not been seen for several years.  She was initially diagnosed with MS in 2014 by Dr. Thompson. Currently not on any medications.  Last MRI brain was 4/20/23.  Currently, no headaches, numbness tingling, or neurological deficits.    Health maintenance:  Immunizations:  -Flu: UTD, 2023  -Pneumococcal: UTD  -Shingrix: Recommended, will consider  -Tdap: UTD, last 2019  Mammogram UTD (last 8/22/2022) - ordered 3/30/2023    Colon CA screening UTD (last 6/10/2019) -10 years  PAP UTD (last 10/20/2022)   CT cardiac scoring UTD (4/1/23)    Last CPE: 2019 (commercial)    12 point ROS reviewed and negative other than as stated in HPI    Objective   /82   Pulse 72   Wt 107 kg (236 lb)   SpO2 96%   BMI 41.81  kg/m²     Physical Exam  Vitals reviewed.   Constitutional:       General: She is not in acute distress.     Appearance: Normal appearance. She is not ill-appearing.   HENT:      Head: Normocephalic and atraumatic.   Eyes:      General: No scleral icterus.     Extraocular Movements: Extraocular movements intact.      Conjunctiva/sclera: Conjunctivae normal.      Pupils: Pupils are equal, round, and reactive to light.   Cardiovascular:      Rate and Rhythm: Normal rate and regular rhythm.      Heart sounds: Normal heart sounds. No murmur heard.     No friction rub. No gallop.   Pulmonary:      Effort: Pulmonary effort is normal. No respiratory distress.      Breath sounds: Normal breath sounds. No stridor. No wheezing, rhonchi or rales.   Musculoskeletal:      Cervical back: Normal range of motion.      Right lower leg: No edema.      Left lower leg: No edema.   Skin:     General: Skin is warm and dry.   Neurological:      Mental Status: She is alert and oriented to person, place, and time. Mental status is at baseline.      Cranial Nerves: No cranial nerve deficit.      Gait: Gait normal.   Psychiatric:         Mood and Affect: Mood normal.         Behavior: Behavior normal.         Assessment/Plan   Problem List Items Addressed This Visit       Benign essential hypertension     Well-controlled.  Continue with losartan 50 mg, metoprolol succinate 100 mg, spironolactone-HCTZ 25-25 mg and amlodipine 10 mg. Maintain strict adherence to DASH diet and incorporate exercise into daily routine.          Relevant Orders    Comprehensive metabolic panel    Diabetes mellitus (CMS/HCC) - Primary     Uncontrolled on metformin 1000 mg twice daily, glipizide 5 mg, Farxiga 10 mg.  Hemoglobin A1c ordered.  Encouraged patient to begin checking BG levels 1-2 times daily.  Schedule for diabetic foot and eye exams.      Per conversation had 12/11/2023, patient agrees to begin an injectable diabetic medication if hemoglobin A1c is not  improved or is higher than current level of 9.4% in January.  In the meantime, patient plans to work on diet and exercise become more compliant with taking glipizide in the morning.         Relevant Orders    Hemoglobin A1c    Dyslipidemia     Continue atorvastatin 20 mg daily. Encourage Mediterranean-style diet regular exercise.         Multiple sclerosis (CMS/[facility])     Schedule with neurology for further management         Relevant Orders    Referral to Neurology      Follow up in 3 to 4 months for CPE or sooner as needed

## 2023-12-15 NOTE — ASSESSMENT & PLAN NOTE
Uncontrolled on metformin 1000 mg twice daily, glipizide 5 mg, Farxiga 10 mg.  Hemoglobin A1c ordered.  Encouraged patient to begin checking BG levels 1-2 times daily.  Schedule for diabetic foot and eye exams.      Per conversation had 12/11/2023, patient agrees to begin an injectable diabetic medication if hemoglobin A1c is not improved or is higher than current level of 9.4% in January.  In the meantime, patient plans to work on diet and exercise become more compliant with taking glipizide in the morning.

## 2024-01-03 ENCOUNTER — APPOINTMENT (OUTPATIENT)
Dept: DERMATOLOGY | Facility: CLINIC | Age: 59
End: 2024-01-03
Payer: COMMERCIAL

## 2024-01-10 ENCOUNTER — LAB (OUTPATIENT)
Dept: LAB | Facility: LAB | Age: 59
End: 2024-01-10
Payer: COMMERCIAL

## 2024-01-10 DIAGNOSIS — I10 BENIGN ESSENTIAL HYPERTENSION: ICD-10-CM

## 2024-01-10 DIAGNOSIS — E11.9 TYPE 2 DIABETES MELLITUS WITHOUT COMPLICATION, WITHOUT LONG-TERM CURRENT USE OF INSULIN (MULTI): ICD-10-CM

## 2024-01-10 LAB
ALBUMIN SERPL BCP-MCNC: 4.1 G/DL (ref 3.4–5)
ALP SERPL-CCNC: 59 U/L (ref 33–110)
ALT SERPL W P-5'-P-CCNC: 16 U/L (ref 7–45)
ANION GAP SERPL CALC-SCNC: 13 MMOL/L (ref 10–20)
AST SERPL W P-5'-P-CCNC: 14 U/L (ref 9–39)
BILIRUB SERPL-MCNC: 0.4 MG/DL (ref 0–1.2)
BUN SERPL-MCNC: 14 MG/DL (ref 6–23)
CALCIUM SERPL-MCNC: 9.6 MG/DL (ref 8.6–10.6)
CHLORIDE SERPL-SCNC: 100 MMOL/L (ref 98–107)
CO2 SERPL-SCNC: 30 MMOL/L (ref 21–32)
CREAT SERPL-MCNC: 0.77 MG/DL (ref 0.5–1.05)
EGFRCR SERPLBLD CKD-EPI 2021: 89 ML/MIN/1.73M*2
EST. AVERAGE GLUCOSE BLD GHB EST-MCNC: 220 MG/DL
GLUCOSE SERPL-MCNC: 223 MG/DL (ref 74–99)
HBA1C MFR BLD: 9.3 %
POTASSIUM SERPL-SCNC: 4.2 MMOL/L (ref 3.5–5.3)
PROT SERPL-MCNC: 7.8 G/DL (ref 6.4–8.2)
SODIUM SERPL-SCNC: 139 MMOL/L (ref 136–145)

## 2024-01-10 PROCEDURE — 83036 HEMOGLOBIN GLYCOSYLATED A1C: CPT

## 2024-01-10 PROCEDURE — 36415 COLL VENOUS BLD VENIPUNCTURE: CPT

## 2024-01-10 PROCEDURE — 80053 COMPREHEN METABOLIC PANEL: CPT

## 2024-01-14 NOTE — RESULT ENCOUNTER NOTE
Hemoglobin A1c remains above goal (<7%) at 9.3%. As discussed in office, I recommend initiation of a new medication, Trulicity or ozempic, pending insurance coverage. Please let me know if interested and I will call one into the pharmacy    Remainder of labs look stable

## 2024-01-19 DIAGNOSIS — E11.9 TYPE 2 DIABETES MELLITUS WITHOUT COMPLICATION, WITHOUT LONG-TERM CURRENT USE OF INSULIN (MULTI): Primary | ICD-10-CM

## 2024-02-15 ENCOUNTER — OFFICE VISIT (OUTPATIENT)
Dept: NEUROLOGY | Facility: CLINIC | Age: 59
End: 2024-02-15
Payer: COMMERCIAL

## 2024-02-15 ENCOUNTER — LAB (OUTPATIENT)
Dept: LAB | Facility: LAB | Age: 59
End: 2024-02-15
Payer: COMMERCIAL

## 2024-02-15 VITALS
HEART RATE: 72 BPM | BODY MASS INDEX: 41.1 KG/M2 | RESPIRATION RATE: 18 BRPM | DIASTOLIC BLOOD PRESSURE: 79 MMHG | WEIGHT: 232 LBS | SYSTOLIC BLOOD PRESSURE: 146 MMHG

## 2024-02-15 DIAGNOSIS — G35 MULTIPLE SCLEROSIS (MULTI): ICD-10-CM

## 2024-02-15 DIAGNOSIS — G35 MULTIPLE SCLEROSIS (MULTI): Primary | ICD-10-CM

## 2024-02-15 LAB — 25(OH)D3 SERPL-MCNC: 37 NG/ML (ref 30–100)

## 2024-02-15 PROCEDURE — 99215 OFFICE O/P EST HI 40 MIN: CPT | Performed by: PSYCHIATRY & NEUROLOGY

## 2024-02-15 PROCEDURE — 4010F ACE/ARB THERAPY RXD/TAKEN: CPT | Performed by: PSYCHIATRY & NEUROLOGY

## 2024-02-15 PROCEDURE — 82306 VITAMIN D 25 HYDROXY: CPT

## 2024-02-15 PROCEDURE — 3077F SYST BP >= 140 MM HG: CPT | Performed by: PSYCHIATRY & NEUROLOGY

## 2024-02-15 PROCEDURE — 36415 COLL VENOUS BLD VENIPUNCTURE: CPT

## 2024-02-15 PROCEDURE — 1036F TOBACCO NON-USER: CPT | Performed by: PSYCHIATRY & NEUROLOGY

## 2024-02-15 PROCEDURE — 3046F HEMOGLOBIN A1C LEVEL >9.0%: CPT | Performed by: PSYCHIATRY & NEUROLOGY

## 2024-02-15 PROCEDURE — 3078F DIAST BP <80 MM HG: CPT | Performed by: PSYCHIATRY & NEUROLOGY

## 2024-02-15 PROCEDURE — 99205 OFFICE O/P NEW HI 60 MIN: CPT | Performed by: PSYCHIATRY & NEUROLOGY

## 2024-02-15 ASSESSMENT — PAIN SCALES - GENERAL: PAINLEVEL: 0-NO PAIN
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
## 2024-02-15 NOTE — PATIENT INSTRUCTIONS
Shola, it was nice to meet you today.   For your MS we will do a new brain MRI to make sure things are stable compared to the old one.   I will also send you to physical therapy and will check your vitamin D level today.  Follow up with me in 6 months.     Importance of adopting a healthy lifestyle, including following a healthy diet, exercising, losing weight, taking Vit D, and of no smoking were discussed.

## 2024-02-21 ENCOUNTER — APPOINTMENT (OUTPATIENT)
Dept: DERMATOLOGY | Facility: CLINIC | Age: 59
End: 2024-02-21
Payer: COMMERCIAL

## 2024-03-11 ENCOUNTER — HOSPITAL ENCOUNTER (OUTPATIENT)
Dept: RADIOLOGY | Facility: CLINIC | Age: 59
Discharge: HOME | End: 2024-03-11
Payer: COMMERCIAL

## 2024-03-11 DIAGNOSIS — G35 MULTIPLE SCLEROSIS (MULTI): ICD-10-CM

## 2024-03-11 PROCEDURE — 70553 MRI BRAIN STEM W/O & W/DYE: CPT

## 2024-03-11 PROCEDURE — A9575 INJ GADOTERATE MEGLUMI 0.1ML: HCPCS | Performed by: PSYCHIATRY & NEUROLOGY

## 2024-03-11 PROCEDURE — 70553 MRI BRAIN STEM W/O & W/DYE: CPT | Performed by: STUDENT IN AN ORGANIZED HEALTH CARE EDUCATION/TRAINING PROGRAM

## 2024-03-11 PROCEDURE — 2550000001 HC RX 255 CONTRASTS: Performed by: PSYCHIATRY & NEUROLOGY

## 2024-03-11 RX ORDER — GADOTERATE MEGLUMINE 376.9 MG/ML
20 INJECTION INTRAVENOUS
Status: COMPLETED | OUTPATIENT
Start: 2024-03-11 | End: 2024-03-11

## 2024-03-11 RX ADMIN — GADOTERATE MEGLUMINE 20 ML: 376.9 INJECTION INTRAVENOUS at 14:35

## 2024-03-14 ENCOUNTER — APPOINTMENT (OUTPATIENT)
Dept: PHYSICAL THERAPY | Facility: HOSPITAL | Age: 59
End: 2024-03-14
Payer: COMMERCIAL

## 2024-03-25 ENCOUNTER — OFFICE VISIT (OUTPATIENT)
Dept: PRIMARY CARE | Facility: CLINIC | Age: 59
End: 2024-03-25
Payer: COMMERCIAL

## 2024-03-25 VITALS
SYSTOLIC BLOOD PRESSURE: 114 MMHG | HEART RATE: 64 BPM | DIASTOLIC BLOOD PRESSURE: 76 MMHG | BODY MASS INDEX: 40.79 KG/M2 | WEIGHT: 230.2 LBS | HEIGHT: 63 IN | OXYGEN SATURATION: 99 % | RESPIRATION RATE: 18 BRPM

## 2024-03-25 DIAGNOSIS — F41.8 ANXIETY WITH DEPRESSION: ICD-10-CM

## 2024-03-25 DIAGNOSIS — Z12.31 ENCOUNTER FOR SCREENING MAMMOGRAM FOR MALIGNANT NEOPLASM OF BREAST: ICD-10-CM

## 2024-03-25 DIAGNOSIS — G35 MULTIPLE SCLEROSIS (MULTI): ICD-10-CM

## 2024-03-25 DIAGNOSIS — E78.5 HYPERLIPIDEMIA, UNSPECIFIED HYPERLIPIDEMIA TYPE: ICD-10-CM

## 2024-03-25 DIAGNOSIS — E11.9 TYPE 2 DIABETES MELLITUS WITHOUT COMPLICATION, WITHOUT LONG-TERM CURRENT USE OF INSULIN (MULTI): ICD-10-CM

## 2024-03-25 DIAGNOSIS — I10 BENIGN ESSENTIAL HYPERTENSION: Primary | ICD-10-CM

## 2024-03-25 PROBLEM — Z86.2 HISTORY OF ANEMIA: Status: ACTIVE | Noted: 2024-03-25

## 2024-03-25 PROBLEM — Z86.69 HISTORY OF DISORDER OF CENTRAL NERVOUS SYSTEM: Status: ACTIVE | Noted: 2024-03-25

## 2024-03-25 PROBLEM — Z86.79 HISTORY OF HYPERTENSION: Status: ACTIVE | Noted: 2024-03-25

## 2024-03-25 PROCEDURE — 3078F DIAST BP <80 MM HG: CPT | Performed by: INTERNAL MEDICINE

## 2024-03-25 PROCEDURE — 4010F ACE/ARB THERAPY RXD/TAKEN: CPT | Performed by: INTERNAL MEDICINE

## 2024-03-25 PROCEDURE — 3074F SYST BP LT 130 MM HG: CPT | Performed by: INTERNAL MEDICINE

## 2024-03-25 PROCEDURE — 3046F HEMOGLOBIN A1C LEVEL >9.0%: CPT | Performed by: INTERNAL MEDICINE

## 2024-03-25 PROCEDURE — 99213 OFFICE O/P EST LOW 20 MIN: CPT | Performed by: INTERNAL MEDICINE

## 2024-03-25 PROCEDURE — 99396 PREV VISIT EST AGE 40-64: CPT | Performed by: INTERNAL MEDICINE

## 2024-03-25 PROCEDURE — 1036F TOBACCO NON-USER: CPT | Performed by: INTERNAL MEDICINE

## 2024-03-25 RX ORDER — METFORMIN HYDROCHLORIDE 1000 MG/1
1000 TABLET ORAL
Qty: 180 TABLET | Refills: 1 | Status: SHIPPED | OUTPATIENT
Start: 2024-03-25 | End: 2024-09-21

## 2024-03-25 ASSESSMENT — ENCOUNTER SYMPTOMS
PALPITATIONS: 0
HEMATURIA: 0
LIGHT-HEADEDNESS: 0
DIAPHORESIS: 0
BACK PAIN: 0
DIZZINESS: 0
APPETITE CHANGE: 0
RHINORRHEA: 0
CHILLS: 0
ARTHRALGIAS: 0
DEPRESSION: 0
ABDOMINAL PAIN: 0
VOMITING: 0
SHORTNESS OF BREATH: 0
MYALGIAS: 0
WHEEZING: 0
NECK PAIN: 0
WEAKNESS: 0
LOSS OF SENSATION IN FEET: 0
CONSTIPATION: 0
SORE THROAT: 0
SINUS PRESSURE: 0
DIFFICULTY URINATING: 0
FATIGUE: 0
HEADACHES: 0
COUGH: 0
DIARRHEA: 0
JOINT SWELLING: 0
BLOOD IN STOOL: 0
OCCASIONAL FEELINGS OF UNSTEADINESS: 0
FEVER: 0
NECK STIFFNESS: 0
FREQUENCY: 0
DYSURIA: 0
ABDOMINAL DISTENTION: 0
NAUSEA: 0
NUMBNESS: 0

## 2024-03-25 ASSESSMENT — PATIENT HEALTH QUESTIONNAIRE - PHQ9
1. LITTLE INTEREST OR PLEASURE IN DOING THINGS: NOT AT ALL
2. FEELING DOWN, DEPRESSED OR HOPELESS: NOT AT ALL
SUM OF ALL RESPONSES TO PHQ9 QUESTIONS 1 AND 2: 0

## 2024-03-25 NOTE — ASSESSMENT & PLAN NOTE
She has not been taking metformin 100 mg twice daily she has been taking it once instead due to loose bowel movements.   Plan:  Repeat A1c and urine albumin  Continue glipizde 20 mg daily   Start GLP1 agonist (ozempic or mounjaro)  Continue metformin 100 mg daily (twice daily if tolerable), as we will likely DC metformin once on appropriate dose to control blood sugars.   Podiatry and ophthalmology checkups recommended

## 2024-03-25 NOTE — ASSESSMENT & PLAN NOTE
-BP above target goal 130/80  -Continue spironolactone-HCTZ  Continue metoprolol 100 mg daily, amlodipine 10 mg daily and losartan 50 mg daily  -Keep BP log  -Low sodium diet

## 2024-03-25 NOTE — PROGRESS NOTES
"Subjective   Patient ID: Shola Osorio is a 58 y.o. female who presents for Annual Exam (Refill/).    HPI   She is doing well generally.       Review of Systems   Constitutional:  Negative for appetite change, chills, diaphoresis, fatigue and fever.   HENT:  Negative for congestion, ear discharge, ear pain, hearing loss, postnasal drip, rhinorrhea, sinus pressure, sore throat and tinnitus.    Eyes:  Negative for visual disturbance.   Respiratory:  Negative for cough, shortness of breath and wheezing.    Cardiovascular:  Negative for chest pain, palpitations and leg swelling.   Gastrointestinal:  Negative for abdominal distention, abdominal pain, blood in stool, constipation, diarrhea, nausea and vomiting.   Genitourinary:  Negative for decreased urine volume, difficulty urinating, dysuria, frequency, hematuria and urgency.   Musculoskeletal:  Negative for arthralgias, back pain, gait problem, joint swelling, myalgias, neck pain and neck stiffness.   Skin:  Negative for rash.   Neurological:  Negative for dizziness, weakness, light-headedness, numbness and headaches.         Objective   /76   Pulse 64   Resp 18   Ht 1.6 m (5' 3\")   Wt 104 kg (230 lb 3.2 oz)   SpO2 99%   BMI 40.78 kg/m²     Physical Exam  Vitals reviewed.   Constitutional:       Appearance: Normal appearance. She is normal weight.   HENT:      Right Ear: Tympanic membrane and external ear normal.      Left Ear: Tympanic membrane and external ear normal.   Eyes:      Extraocular Movements: Extraocular movements intact.      Conjunctiva/sclera: Conjunctivae normal.      Pupils: Pupils are equal, round, and reactive to light.   Cardiovascular:      Rate and Rhythm: Normal rate and regular rhythm.      Pulses: Normal pulses.   Pulmonary:      Effort: Pulmonary effort is normal.      Breath sounds: Normal breath sounds.   Abdominal:      General: Bowel sounds are normal.      Palpations: Abdomen is soft.   Musculoskeletal:         General: " Normal range of motion.      Cervical back: Normal range of motion.   Skin:     General: Skin is warm and dry.   Neurological:      General: No focal deficit present.      Mental Status: She is oriented to person, place, and time.   Psychiatric:         Mood and Affect: Mood normal.         Behavior: Behavior normal.           Assessment/Plan   Problem List Items Addressed This Visit             ICD-10-CM    Benign essential hypertension - Primary I10     -BP above target goal 130/80  -Continue spironolactone-HCTZ  Continue metoprolol 100 mg daily, amlodipine 10 mg daily and losartan 50 mg daily  -Keep BP log  -Low sodium diet           Diabetes mellitus (CMS/HCC) E11.9     She has not been taking metformin 100 mg twice daily she has been taking it once instead due to loose bowel movements.   Plan:  Repeat A1c and urine albumin  Continue glipizde 20 mg daily   Start GLP1 agonist (ozempic or mounjaro)  Continue metformin 100 mg daily (twice daily if tolerable), as we will likely DC metformin once on appropriate dose to control blood sugars.   Podiatry and ophthalmology checkups recommended           Relevant Medications    metFORMIN (Glucophage) 1,000 mg tablet    Other Relevant Orders    Follow Up In [facility] - Pharmacy    Referral to Podiatry    Referral to Ophthalmology    Albumin , Urine Random    Hemoglobin A1C    Multiple sclerosis (CMS/HCC) G35     Stable  F/U with Neurology          Hyperlipidemia E78.5     Atorvastatin 20 mg daily         Relevant Orders    Cholesterol, LDL Direct    Lipid Panel Non-Fasting    Encounter for screening mammogram for malignant neoplasm of breast Z12.31    Relevant Orders    BI mammo bilateral screening tomosynthesis    Anxiety with depression F41.8     Referred to behavioral health for psychotherapy          Relevant Orders    Follow Up In [facility]   RTC in 6-8 weeks for BP F/U

## 2024-03-28 ENCOUNTER — APPOINTMENT (OUTPATIENT)
Dept: PHYSICAL THERAPY | Facility: CLINIC | Age: 59
End: 2024-03-28
Payer: COMMERCIAL

## 2024-04-01 ENCOUNTER — EVALUATION (OUTPATIENT)
Dept: PHYSICAL THERAPY | Facility: CLINIC | Age: 59
End: 2024-04-01
Payer: COMMERCIAL

## 2024-04-01 DIAGNOSIS — R29.898 WEAKNESS OF RIGHT LEG: ICD-10-CM

## 2024-04-01 DIAGNOSIS — R26.9 ABNORMAL GAIT: Primary | ICD-10-CM

## 2024-04-01 DIAGNOSIS — G35 MULTIPLE SCLEROSIS (MULTI): ICD-10-CM

## 2024-04-01 PROCEDURE — 97161 PT EVAL LOW COMPLEX 20 MIN: CPT | Mod: GP | Performed by: PHYSICAL THERAPIST

## 2024-04-01 PROCEDURE — 97110 THERAPEUTIC EXERCISES: CPT | Mod: GP | Performed by: PHYSICAL THERAPIST

## 2024-04-01 ASSESSMENT — ENCOUNTER SYMPTOMS
LOSS OF SENSATION IN FEET: 0
OCCASIONAL FEELINGS OF UNSTEADINESS: 1
DEPRESSION: 0

## 2024-04-01 NOTE — PROGRESS NOTES
Physical Therapy    Physical Therapy Evaluation and Treatment      Patient Name: Shola Osorio  MRN: [id number]  Today's Date: 4/1/2024  Time Calculation  Start Time: 0810  Stop Time: 0910  Time Calculation (min): 60 minVisit #1  Insurance - MMO - no authorization req.   40 combined PT/OT, no copay    Assessment: Ms. Osorio presents with limited ROM R ankle - residual from distal tib fx 10/2023, Core weakness and R LE weakness contributing to impaired gait, functional endurance, and occasional falls.  She will benefit from personalized HEP, instructions for community gym activities, to strengthen core and R LE for improved postural stability, generalized conditioning and safety.  Excellent rehabilitation potential.  Patient in agreement with PT POC.  Plan: R LE and core strengthening, gait training without device for improved neuromuscular activation and coordination.   1-2x a week for 6 weeks.      Current Problem:   1. Abnormal gait  Follow Up In Physical Therapy      2. Multiple sclerosis (CMS/HCC)  Referral to Physical Therapy    Follow Up In Physical Therapy      3. Weakness of right leg            Subjective  :  58 year old female with MS diagnosis since 2012.  Complaints of R LE weakness, gait instability and frequent falls.  Suffered a R distal fib fracture 10/2023 when she fell in her yard - Treated conservatively with boot and limited weightbearing.    General:  General  Reason for Referral: Diagnosis  G35 (ICD-10-CM) - Multiple sclerosis (CMS/HCC)  Referred By: Justice Wellington  Precautions:  Precautions  CB Fall Risk Score (The score of 4 or more indicates an increased risk of falling): 9    Pain: R ankle distal and posterior with bumping and extended walking.  No pain at rest. Rare use of medications.  No TTP.     Home Living: ranch style home - lives with 19 year old daughter     Prior Level of Function:   Independent with occasional falls.  Works full time at [employer]    Objective   AROM R  ankle:  L ankle - WFL    DF - to neutral   PF - 0-40   INV - 0-35   EV - 0-15    MMT  RLE LLE  HF  3 3  H ABD  3+ 4  KE  4+ 5  KF  4+ 4+  DF  5 5  PF  4+ 5  INV  4+ 5  EV  5 5      Outcome Measures:  Tinetti Balance - TBA   Tinetti Gait - 10/12   5x sit to stand - 16.5 s with arms across chest.  TUG - with push up from arms - 7.3 sec no ad.  10 MWT - 6.0 sec - no ad,    Treatments:   Therex - 15 minutes   - Supine SLR - 1x10 B (HEP)   - bridge - 1x10   - butterfly bridge - 1x10 (HEP)   - Sidelying clam shell - 1x5 B (HEP)   - initiated HEP with diagrams and written instructions    EDUCATION:   Prefers written instruction/diagrams to augment verbal instructions    Goals:  Active       PT Problem       Strength Goal       Start:  04/01/24    Expected End:  06/30/24       Patient will be independent and compliant with HEP and community gym exercise program as evidence of investment in progression of core and LE strength         Balance Goal       Start:  04/01/24    Expected End:  06/30/24       Patient will demonstrate 12/12 on Tinetti Balance and at least 48/56 on Borja Balance as evidence of improved dynamic standing balance.         Gait Goal       Start:  04/01/24    Expected End:  06/30/24       Patient will demonstrate at least 12/12 on Tinetti Gait and 8/10 on Mini Best Test gait scale as evidence of improved safety and confidence in gait without assistive device.

## 2024-04-03 ENCOUNTER — LAB (OUTPATIENT)
Dept: LAB | Facility: LAB | Age: 59
End: 2024-04-03
Payer: COMMERCIAL

## 2024-04-03 DIAGNOSIS — E11.9 TYPE 2 DIABETES MELLITUS WITHOUT COMPLICATION, WITHOUT LONG-TERM CURRENT USE OF INSULIN (MULTI): ICD-10-CM

## 2024-04-03 DIAGNOSIS — E78.5 HYPERLIPIDEMIA, UNSPECIFIED HYPERLIPIDEMIA TYPE: ICD-10-CM

## 2024-04-03 LAB
CHOLEST SERPL-MCNC: 159 MG/DL (ref 0–199)
CHOLESTEROL/HDL RATIO: 4.1
CREAT UR-MCNC: 99.4 MG/DL (ref 20–320)
EST. AVERAGE GLUCOSE BLD GHB EST-MCNC: 206 MG/DL
HBA1C MFR BLD: 8.8 %
HDLC SERPL-MCNC: 39 MG/DL
LDLC SERPL DIRECT ASSAY-MCNC: 100 MG/DL (ref 0–129)
MICROALBUMIN UR-MCNC: <7 MG/L
MICROALBUMIN/CREAT UR: NORMAL MG/G{CREAT}
NON-HDL CHOLESTEROL: 120 MG/DL (ref 0–149)

## 2024-04-03 PROCEDURE — 83721 ASSAY OF BLOOD LIPOPROTEIN: CPT

## 2024-04-03 PROCEDURE — 82465 ASSAY BLD/SERUM CHOLESTEROL: CPT

## 2024-04-03 PROCEDURE — 82043 UR ALBUMIN QUANTITATIVE: CPT

## 2024-04-03 PROCEDURE — 36415 COLL VENOUS BLD VENIPUNCTURE: CPT

## 2024-04-03 PROCEDURE — 82570 ASSAY OF URINE CREATININE: CPT

## 2024-04-03 PROCEDURE — 83036 HEMOGLOBIN GLYCOSYLATED A1C: CPT

## 2024-04-03 PROCEDURE — 83718 ASSAY OF LIPOPROTEIN: CPT

## 2024-04-05 RX ORDER — TIRZEPATIDE 2.5 MG/.5ML
2.5 INJECTION, SOLUTION SUBCUTANEOUS
Qty: 2 ML | Refills: 1 | Status: SHIPPED | OUTPATIENT
Start: 2024-04-05 | End: 2024-04-10 | Stop reason: ALTCHOICE

## 2024-04-05 NOTE — RESULT ENCOUNTER NOTE
Blood sugars are poorly controlled.  A1c 8.8 goal is less than 7.  Starting GLP-1 agonist, Mounjaro 2.5 mg weekly sent to pharmacy.  [facility] pharmacist brought on board to assist.    Blood test otherwise normal.

## 2024-04-08 ENCOUNTER — TELEPHONE (OUTPATIENT)
Dept: PRIMARY CARE | Facility: CLINIC | Age: 59
End: 2024-04-08
Payer: COMMERCIAL

## 2024-04-08 ENCOUNTER — DOCUMENTATION (OUTPATIENT)
Dept: PHYSICAL THERAPY | Facility: CLINIC | Age: 59
End: 2024-04-08
Payer: COMMERCIAL

## 2024-04-08 ENCOUNTER — APPOINTMENT (OUTPATIENT)
Dept: PHYSICAL THERAPY | Facility: CLINIC | Age: 59
End: 2024-04-08
Payer: COMMERCIAL

## 2024-04-08 NOTE — TELEPHONE ENCOUNTER
----- Message from Aldo Saravia MD sent at 4/5/2024  2:34 AM EDT -----  Blood sugars are poorly controlled.  A1c 8.8 goal is less than 7.  Starting GLP-1 agonist, Mounjaro 2.5 mg weekly sent to pharmacy.  [facility] pharmacist brought on board to assist.    Blood test otherwise normal.

## 2024-04-08 NOTE — TELEPHONE ENCOUNTER
Result Communication    Resulted Orders   Cholesterol, LDL Direct   Result Value Ref Range    LDL, Direct 100 0 - 129 mg/dL    Narrative    Elevated levels of LDL cholesterol are recognized as a key factor in the development of atherosclerosis and CHD. The direct LDL cholesterol test can be used to assess cardiovascular risk and monitor therapy as a follow up to   a lipid profile when triglycerides are significantly elevated.   Lipid Panel Non-Fasting   Result Value Ref Range    Cholesterol 159 0 - 199 mg/dL      Comment:            Age      Desirable   Borderline High   High     0-19 Y     0 - 169       170 - 199     >/= 200    20-24 Y     0 - 189       190 - 224     >/= 225         >24 Y     0 - 199       200 - 239     >/= 240   **All ranges are based on fasting samples. Specific   therapeutic targets will vary based on patient-specific   cardiac risk.    Pediatric guidelines reference:Pediatrics 2011, 128(S5).Adult guidelines reference: NCEP ATPIII Guidelines,NELSON 2001, 258:2486-97    Venipuncture immediately after or during the administration of Metamizole may lead to falsely low results. Testing should be performed immediately prior to Metamizole dosing.    HDL-Cholesterol 39.0 mg/dL      Comment:        Age       Very Low   Low     Normal    High    0-19 Y    < 35      < 40     40-45     ----  20-24 Y    ----     < 40      >45      ----        >24 Y      ----     < 40     40-60      >60      Cholesterol/HDL Ratio 4.1       Comment:        Ref Values  Desirable  < 3.4  High Risk  > 5.0    Non-HDL Cholesterol 120 0 - 149 mg/dL      Comment:         Age        Desiable   Borderline High   High     Very High   0-19 Y     0 - 119       120 - 144     >/= 145    >/= 160  20-24 Y     0 - 149       150 - 189     >/= 190      ----       >24 Y    30 MG/DL ABOVE LDL CHOLESTEROL GOAL   Albumin , Urine Random   Result Value Ref Range    Albumin, Urine Random <7.0 Not established mg/L    Creatinine, Urine Random 99.4 20.0 -  320.0 mg/dL    Albumin/Creatine Ratio        Comment:      One or more analytes used in this calculation is outside of the analytical measurement range. Calculation cannot be performed.   Hemoglobin A1C   Result Value Ref Range    Hemoglobin A1C 8.8 (H) see below %    Estimated Average Glucose 206 Not Established mg/dL    Narrative    Diagnosis of Diabetes-Adults  Non-Diabetic: < or = 5.6%  Increased risk for developing diabetes: 5.7-6.4%  Diagnostic of diabetes: > or = 6.5%    Monitoring of Diabetes  Age (y)....................... Therapeutic Goal (%)  Adults: >18.........................<7.0  Pediatrics: 13-18...................<7.5  Pediatrics: 7-12....................<8.0  Pediatrics: 0-6..................... 7.5-8.5    American Diabetes Association. Diabetes Care 33(S1), Jan 2010           10:01 AM      Results were successfully communicated with the patient and they acknowledged their understanding.    Pt returned call regarding lab results

## 2024-04-08 NOTE — TELEPHONE ENCOUNTER
Result Communication    Resulted Orders   Cholesterol, LDL Direct   Result Value Ref Range    LDL, Direct 100 0 - 129 mg/dL    Narrative    Elevated levels of LDL cholesterol are recognized as a key factor in the development of atherosclerosis and CHD. The direct LDL cholesterol test can be used to assess cardiovascular risk and monitor therapy as a follow up to   a lipid profile when triglycerides are significantly elevated.   Lipid Panel Non-Fasting   Result Value Ref Range    Cholesterol 159 0 - 199 mg/dL      Comment:            Age      Desirable   Borderline High   High     0-19 Y     0 - 169       170 - 199     >/= 200    20-24 Y     0 - 189       190 - 224     >/= 225         >24 Y     0 - 199       200 - 239     >/= 240   **All ranges are based on fasting samples. Specific   therapeutic targets will vary based on patient-specific   cardiac risk.    Pediatric guidelines reference:Pediatrics 2011, 128(S5).Adult guidelines reference: NCEP ATPIII Guidelines,NELSON 2001, 258:2486-97    Venipuncture immediately after or during the administration of Metamizole may lead to falsely low results. Testing should be performed immediately prior to Metamizole dosing.    HDL-Cholesterol 39.0 mg/dL      Comment:        Age       Very Low   Low     Normal    High    0-19 Y    < 35      < 40     40-45     ----  20-24 Y    ----     < 40      >45      ----        >24 Y      ----     < 40     40-60      >60      Cholesterol/HDL Ratio 4.1       Comment:        Ref Values  Desirable  < 3.4  High Risk  > 5.0    Non-HDL Cholesterol 120 0 - 149 mg/dL      Comment:         Age        Desiable   Borderline High   High     Very High   0-19 Y     0 - 119       120 - 144     >/= 145    >/= 160  20-24 Y     0 - 149       150 - 189     >/= 190      ----       >24 Y    30 MG/DL ABOVE LDL CHOLESTEROL GOAL   Albumin , Urine Random   Result Value Ref Range    Albumin, Urine Random <7.0 Not established mg/L    Creatinine, Urine Random 99.4 20.0 -  320.0 mg/dL    Albumin/Creatine Ratio        Comment:      One or more analytes used in this calculation is outside of the analytical measurement range. Calculation cannot be performed.   Hemoglobin A1C   Result Value Ref Range    Hemoglobin A1C 8.8 (H) see below %    Estimated Average Glucose 206 Not Established mg/dL    Narrative    Diagnosis of Diabetes-Adults  Non-Diabetic: < or = 5.6%  Increased risk for developing diabetes: 5.7-6.4%  Diagnostic of diabetes: > or = 6.5%    Monitoring of Diabetes  Age (y)....................... Therapeutic Goal (%)  Adults: >18.........................<7.0  Pediatrics: 13-18...................<7.5  Pediatrics: 7-12....................<8.0  Pediatrics: 0-6..................... 7.5-8.5    American Diabetes Association. Diabetes Care 33(S1), Jan 2010           8:59 AM      Results were not successfully communicated with the patient and they did not acknowledge their understanding.

## 2024-04-10 ENCOUNTER — TELEMEDICINE (OUTPATIENT)
Dept: PHARMACY | Facility: HOSPITAL | Age: 59
End: 2024-04-10
Payer: COMMERCIAL

## 2024-04-10 DIAGNOSIS — E11.69 TYPE 2 DIABETES MELLITUS WITH OTHER SPECIFIED COMPLICATION, WITHOUT LONG-TERM CURRENT USE OF INSULIN (MULTI): Primary | ICD-10-CM

## 2024-04-10 DIAGNOSIS — E11.9 TYPE 2 DIABETES MELLITUS WITHOUT COMPLICATION, WITHOUT LONG-TERM CURRENT USE OF INSULIN (MULTI): ICD-10-CM

## 2024-04-10 RX ORDER — DULAGLUTIDE 0.75 MG/.5ML
0.75 INJECTION, SOLUTION SUBCUTANEOUS
Qty: 2 ML | Refills: 1 | Status: SHIPPED | OUTPATIENT
Start: 2024-04-14 | End: 2024-05-13 | Stop reason: DRUGHIGH

## 2024-04-10 NOTE — PROGRESS NOTES
"Pharmacist Clinic: Diabetes Management  Shola Osorio is a 58 y.o. female was referred to Clinical Pharmacy Team for diabetes management.     Referring Provider: Aldo Saravia MD     HISTORY OF PRESENT ILLNESS  Approximate Date of Diagnosis: diagnosed in 2012; historically A1c has never been below 7.9%   Known complications due to diabetes included chronic kidney disease and obesity    Diet:   - 3 meals per day   - Patient has been working on eating healthier, however at times it is not that easy if money is tight    LAB REVIEW   Glucose (mg/dL)   Date Value   01/10/2024 223 (H)   10/09/2023 152 (H)   07/05/2023 270 (H)   05/06/2022 274 (H)   08/18/2021 242 (H)     Hemoglobin A1C (%)   Date Value   04/03/2024 8.8 (H)   01/10/2024 9.3 (H)   10/09/2023 9.4 (H)     Bicarbonate (mmol/L)   Date Value   01/10/2024 30   10/09/2023 32   07/05/2023 28   05/06/2022 30   08/18/2021 30     Urea Nitrogen (mg/dL)   Date Value   01/10/2024 14   10/09/2023 16   07/05/2023 16   05/06/2022 13   08/18/2021 11     Creatinine (mg/dL)   Date Value   01/10/2024 0.77   10/09/2023 0.86   07/05/2023 0.97   05/06/2022 0.90   08/18/2021 0.80     Lab Results   Component Value Date    HGBA1C 8.8 (H) 04/03/2024    HGBA1C 9.3 (H) 01/10/2024    HGBA1C 9.4 (H) 10/09/2023     Lab Results   Component Value Date    CHOL 159 04/03/2024    CHOL 165 03/30/2023    CHOL 151 07/29/2022     Lab Results   Component Value Date    HDL 39.0 04/03/2024    HDL 40.4 03/30/2023    HDL 37.6 (A) 07/29/2022     No results found for: \"LDLCALC\"  Lab Results   Component Value Date    TRIG 127 03/30/2023    TRIG 107 07/29/2022    TRIG 122 08/18/2021       DIABETES ASSESSMENT    CURRENT PHARMACOTHERAPY  - metformin 1000 mg twice daily   - glipizide 5 mg once daily   - farxiga 10 mg once daily     SECONDARY PREVENTION  - Statin? Yes  - ACE-I/ARB? Yes    SMBG MEASUREMENTS: patient is part of the [facility] program and uses their meter     DISCUSSION:   Went over most recent A1c " of 8.8% and goals of less than 7%   Went over needing additional medication   Counseled patient on trulicity, the preferred glp1 on her insurance   Went over MOA, expectations, side effects, etc   Answered all questions and concerns     RECOMMENDATIONS/PLAN  1. Patients diabetes is poorly controlled with most recent A1c of 8.8 % (goal < 7 %).   - Continue all meds under the continuation of care with the referring provider and clinical pharmacy team.  - Initiate trulicity 0.75 mg once weekly.     Clinical Pharmacist follow up: 2 weeks    Thank you,  Mary Ann Goyal, PharmD    Verbal consent to manage patient's drug therapy was obtained from the patient. They were informed they may decline to participate or withdraw from participation in pharmacy services at any time.

## 2024-04-15 ENCOUNTER — TREATMENT (OUTPATIENT)
Dept: PHYSICAL THERAPY | Facility: CLINIC | Age: 59
End: 2024-04-15
Payer: COMMERCIAL

## 2024-04-15 DIAGNOSIS — G35 MULTIPLE SCLEROSIS (MULTI): ICD-10-CM

## 2024-04-15 DIAGNOSIS — I10 BENIGN ESSENTIAL HYPERTENSION: ICD-10-CM

## 2024-04-15 DIAGNOSIS — R26.9 ABNORMAL GAIT: Primary | ICD-10-CM

## 2024-04-15 PROCEDURE — 97112 NEUROMUSCULAR REEDUCATION: CPT | Mod: GP | Performed by: PHYSICAL THERAPIST

## 2024-04-15 PROCEDURE — 97116 GAIT TRAINING THERAPY: CPT | Mod: GP | Performed by: PHYSICAL THERAPIST

## 2024-04-15 RX ORDER — METOPROLOL SUCCINATE 100 MG/1
100 TABLET, EXTENDED RELEASE ORAL DAILY
Qty: 90 TABLET | Refills: 1 | Status: SHIPPED | OUTPATIENT
Start: 2024-04-15

## 2024-04-15 NOTE — PROGRESS NOTES
Physical Therapy    Physical Therapy Treatment    Patient Name: Shola Osorio  MRN: [id number]  Today's Date: 4/15/2024  Time Calculation  Start Time: 0805  Stop Time: 0845  Time Calculation (min): 40 min    PT Therapeutic Procedures Time Entry  Neuromuscular Re-Education Time Entry: 25  Gait Training Time Entry: 20        Visit #2  Primary Dx - Abnormal gait  MMO - 40 PT/OT combined    Assessment:  Ms Osorio presents with moderate LE and core weakness, but reports she is eager to work on this.  She has not done the exercises assigned every day, but most days/  We reviewed HEP, added more exercises and discussed need to increase daily physical activity.  Patient is also beginning at [employer] and will be working otu there 5x a week.     Plan:  Progress POC.       Current Problem  1. Abnormal gait  Follow Up In Physical Therapy      2. Multiple sclerosis (Multi)  Follow Up In Physical Therapy            Subjective  What is the plan? Should I continue to come to therapy or can I do these exercises by myself at [employer]?    Pain  Patient denies pain today       Objective   Treatments:  NMR:     - Supine exercises: -  SLR - 15x B with relaxation between reps     Butterfly bridge - 15x      Sidelying clamshells - 15x R/L     Curls ups - 2x10     Diagonal curlups - 2x10 R/L     Mini squalt from chair - 10x  Gait training: - Patient entered/exited clinic without ad Independently - 50'x2  Gait as above in clinic on carpeted, well lit, flat surfaces - 400'x1.    OP EDUCATION:   Reviewed HEP - corrected form on SLR and bridges, assigned new exercises - with diagrams and discriptions.    Goals:  Active       PT Problem       Strength Goal       Start:  04/01/24    Expected End:  06/30/24       Patient will be independent and compliant with HEP and community gym exercise program as evidence of investment in progression of core and LE strength         Balance Goal       Start:  04/01/24    Expected End:  06/30/24        Patient will demonstrate 12/12 on Tinetti Balance and at least 48/56 on Borja Balance as evidence of improved dynamic standing balance.         Gait Goal       Start:  04/01/24    Expected End:  06/30/24       Patient will demonstrate at least 12/12 on Tinetti Gait and 8/10 on Mini Best Test gait scale as evidence of improved safety and confidence in gait without assistive device.

## 2024-04-16 ENCOUNTER — APPOINTMENT (OUTPATIENT)
Dept: PRIMARY CARE | Facility: CLINIC | Age: 59
End: 2024-04-16
Payer: COMMERCIAL

## 2024-04-18 DIAGNOSIS — E11.9 TYPE 2 DIABETES MELLITUS WITHOUT COMPLICATION, WITHOUT LONG-TERM CURRENT USE OF INSULIN (MULTI): ICD-10-CM

## 2024-04-18 DIAGNOSIS — I10 BENIGN ESSENTIAL HYPERTENSION: ICD-10-CM

## 2024-04-21 RX ORDER — SPIRONOLACTONE AND HYDROCHLOROTHIAZIDE 25; 25 MG/1; MG/1
1 TABLET ORAL DAILY
Qty: 90 TABLET | Refills: 1 | Status: SHIPPED | OUTPATIENT
Start: 2024-04-21

## 2024-04-21 RX ORDER — GLIPIZIDE 5 MG/1
10 TABLET ORAL
Qty: 360 TABLET | Refills: 1 | Status: SHIPPED | OUTPATIENT
Start: 2024-04-21 | End: 2024-05-13 | Stop reason: ALTCHOICE

## 2024-04-21 RX ORDER — LOSARTAN POTASSIUM 50 MG/1
50 TABLET ORAL DAILY
Qty: 90 TABLET | Refills: 1 | Status: SHIPPED | OUTPATIENT
Start: 2024-04-21

## 2024-04-22 ENCOUNTER — TREATMENT (OUTPATIENT)
Dept: PHYSICAL THERAPY | Facility: CLINIC | Age: 59
End: 2024-04-22
Payer: COMMERCIAL

## 2024-04-22 DIAGNOSIS — R26.9 ABNORMAL GAIT: ICD-10-CM

## 2024-04-22 DIAGNOSIS — G35 MULTIPLE SCLEROSIS (MULTI): ICD-10-CM

## 2024-04-22 PROCEDURE — 97110 THERAPEUTIC EXERCISES: CPT | Mod: GP | Performed by: PHYSICAL THERAPIST

## 2024-04-22 PROCEDURE — 97116 GAIT TRAINING THERAPY: CPT | Mod: GP | Performed by: PHYSICAL THERAPIST

## 2024-04-22 NOTE — PROGRESS NOTES
"Physical Therapy    Physical Therapy Treatment    Patient Name: Shola Osorio  MRN: [id number]  Today's Date: 4/22/2024  Time Calculation  Start Time: 0800  Stop Time: 0845  Time Calculation (min): 45 min    PT Therapeutic Procedures Time Entry  Therapeutic Exercise Time Entry: 30  Gait Training Time Entry: 15          Visit #3  Primary Dx - Abnormal gait  MMO - 40 PT/OT combined    Assessment:  Ms Osorio reports she decided to not participate in community gym exercise program so she can focus on therapy program, but she did not do the HEP very much last week.  She plans on improving familiarity and compliance with program this week.  She demonstrates [id number] snapping of R knee during stair climbing and walking especially when fatigued.  We will continue to address this with strengthening exercises and increase self monitoring and correction on a daily basis.     Plan:  Progress POC.       Current Problem  1. Multiple sclerosis (Multi)  Follow Up In Physical Therapy      2. 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  I decided to suspend the "[employer] fitness program until later in the year.  I want to focus on the therapy program.    Pain - no pain - just fatigue       Objective   Treatments:  NMR:     - HEP review - 5-10 reps each: -    SLR -    Butterfly bridge     Sidelying clamshells    Curls ups    Diagonal curlups    Mini squalt from chair   Standing ex:    Steamboats - lateral and posterior, alternating LE - 10x each sets   Steps ups - at 6\" steps - 2 x 15 L/R  Gait training: - Patient entered/exited clinic without ad Independently - 50'x2  Gait over ground - 50'x4 - cues for correction of R knee snap back.  Gait as above in clinic on carpeted, well lit, flat surfaces - 200'x1.    OP EDUCATION:  Current HEP:  SLR - 15x B with relaxation between " reps   Butterfly bridge - 15x    Sidelying clamshells - 15x R/L   Curls ups - 2x10   Diagonal curlups - 2x10 R/L   Mini squalt from chair - 10x   ADDED - step ups 10 x 2 R/L with single UE support   [place] access code [email or web address].  Goals:  Active       PT Problem       Strength Goal       Start:  04/01/24    Expected End:  06/30/24       Patient will be independent and compliant with [facility] and community gym exercise program as evidence of investment in progression of core and LE strength         Balance Goal       Start:  04/01/24    Expected End:  06/30/24       Patient will demonstrate 12/12 on Tinetti Balance and at least 48/56 on Borja Balance as evidence of improved dynamic standing balance.         Gait Goal       Start:  04/01/24    Expected End:  06/30/24       Patient will demonstrate at least 12/12 on Tinetti Gait and 8/10 on Mini Best Test gait scale as evidence of improved safety and confidence in gait without assistive device.

## 2024-04-23 DIAGNOSIS — I10 BENIGN ESSENTIAL HYPERTENSION: ICD-10-CM

## 2024-04-23 RX ORDER — AMLODIPINE BESYLATE 10 MG/1
10 TABLET ORAL DAILY
Qty: 90 TABLET | Refills: 1 | Status: SHIPPED | OUTPATIENT
Start: 2024-04-23

## 2024-04-24 ENCOUNTER — TELEMEDICINE (OUTPATIENT)
Dept: PHARMACY | Facility: HOSPITAL | Age: 59
End: 2024-04-24
Payer: COMMERCIAL

## 2024-04-24 DIAGNOSIS — E11.69 TYPE 2 DIABETES MELLITUS WITH OTHER SPECIFIED COMPLICATION, WITHOUT LONG-TERM CURRENT USE OF INSULIN (MULTI): Primary | ICD-10-CM

## 2024-04-24 RX ORDER — BLOOD-GLUCOSE,RECEIVER,CONT
EACH MISCELLANEOUS
Qty: 1 EACH | Refills: 0 | Status: SHIPPED | OUTPATIENT
Start: 2024-04-24

## 2024-04-24 RX ORDER — BLOOD-GLUCOSE SENSOR
EACH MISCELLANEOUS
Qty: 2 EACH | Refills: 5 | Status: SHIPPED | OUTPATIENT
Start: 2024-04-24

## 2024-04-24 NOTE — PROGRESS NOTES
"Pharmacist Clinic: Diabetes Management  Shola Osorio is a 58 y.o. female was referred to Clinical Pharmacy Team for diabetes management.     Referring Provider: Aldo Saravia MD     HISTORY OF PRESENT ILLNESS  Approximate Date of Diagnosis: diagnosed in 2012; historically A1c has never been below 7.9%   Known complications due to diabetes included chronic kidney disease and obesity    Diet:   - 3 meals per day   - Patient has been working on eating healthier, however at times it is not that easy if money is tight    LAB REVIEW   Glucose (mg/dL)   Date Value   01/10/2024 223 (H)   10/09/2023 152 (H)   07/05/2023 270 (H)   05/06/2022 274 (H)   08/18/2021 242 (H)     Hemoglobin A1C (%)   Date Value   04/03/2024 8.8 (H)   01/10/2024 9.3 (H)   10/09/2023 9.4 (H)     Bicarbonate (mmol/L)   Date Value   01/10/2024 30   10/09/2023 32   07/05/2023 28   05/06/2022 30   08/18/2021 30     Urea Nitrogen (mg/dL)   Date Value   01/10/2024 14   10/09/2023 16   07/05/2023 16   05/06/2022 13   08/18/2021 11     Creatinine (mg/dL)   Date Value   01/10/2024 0.77   10/09/2023 0.86   07/05/2023 0.97   05/06/2022 0.90   08/18/2021 0.80     Lab Results   Component Value Date    HGBA1C 8.8 (H) 04/03/2024    HGBA1C 9.3 (H) 01/10/2024    HGBA1C 9.4 (H) 10/09/2023     Lab Results   Component Value Date    CHOL 159 04/03/2024    CHOL 165 03/30/2023    CHOL 151 07/29/2022     Lab Results   Component Value Date    HDL 39.0 04/03/2024    HDL 40.4 03/30/2023    HDL 37.6 (A) 07/29/2022     No results found for: \"LDLCALC\"  Lab Results   Component Value Date    TRIG 127 03/30/2023    TRIG 107 07/29/2022    TRIG 122 08/18/2021       DIABETES ASSESSMENT    CURRENT PHARMACOTHERAPY  - metformin 1000 mg twice daily   - glipizide 5 mg twice daily   - farxiga 10 mg once daily     SECONDARY PREVENTION  - Statin? Yes  - ACE-I/ARB? Yes    SMBG MEASUREMENTS: patient is part of the [facility] program and uses their meter   - this AM patient saw 90 and it scared " her     DISCUSSION:   Patient reports her blood glucose readings have been the lowest she has seen in a long time   She has focused on adherence to her current medication regimen, she reports in the past she did not always take her pills the way she was supposed to   This AM she saw a 90, it scared her  Counseled patient on goal readings between  in the AM   Counseled if number goes under 70, have CHELLE/melissa   Discussed opportuniyt to use CGM to help better understand BG readings throughout the day  Counseled patient on the freestyle yomaira   Answered all questions and concerns   Patient has not yet started trulicity  Counseled patient on trulicity, the preferred glp1 on her insurance   Went over MOA, expectations, side effects, etc   Answered all questions and concerns     RECOMMENDATIONS/PLAN  1. Patients diabetes is poorly controlled with most recent A1c of 8.8 % (goal < 7 %).   - Continue all meds under the continuation of care with the referring provider and clinical pharmacy team.  - Initiate trulicity 0.75 mg once weekly.   - Start using your freestyle yomaira.     Clinical Pharmacist follow up: 1 week    Thank you,  Mary Ann Goyal, PharmD    Verbal consent to manage patient's drug therapy was obtained from the patient. They were informed they may decline to participate or withdraw from participation in pharmacy services at any time.

## 2024-04-29 ENCOUNTER — DOCUMENTATION (OUTPATIENT)
Dept: PHYSICAL THERAPY | Facility: CLINIC | Age: 59
End: 2024-04-29
Payer: COMMERCIAL

## 2024-04-29 DIAGNOSIS — E11.9 TYPE 2 DIABETES MELLITUS WITHOUT COMPLICATION, WITH LONG-TERM CURRENT USE OF INSULIN (MULTI): ICD-10-CM

## 2024-04-29 DIAGNOSIS — Z79.4 TYPE 2 DIABETES MELLITUS WITHOUT COMPLICATION, WITH LONG-TERM CURRENT USE OF INSULIN (MULTI): ICD-10-CM

## 2024-04-29 NOTE — PROGRESS NOTES
Physical Therapy                 Therapy Communication Note    Patient Name: Shola Osorio  MRN: [id number]  Today's Date: 4/29/2024     Discipline: Physical Therapy    Missed Time: No Show

## 2024-04-30 RX ORDER — DAPAGLIFLOZIN 10 MG/1
10 TABLET, FILM COATED ORAL EVERY MORNING
Qty: 90 TABLET | Refills: 1 | Status: SHIPPED | OUTPATIENT
Start: 2024-04-30

## 2024-05-01 ENCOUNTER — TELEMEDICINE (OUTPATIENT)
Dept: PHARMACY | Facility: HOSPITAL | Age: 59
End: 2024-05-01
Payer: COMMERCIAL

## 2024-05-01 ENCOUNTER — HOSPITAL ENCOUNTER (OUTPATIENT)
Dept: RADIOLOGY | Facility: CLINIC | Age: 59
Discharge: HOME | End: 2024-05-01
Payer: COMMERCIAL

## 2024-05-01 DIAGNOSIS — E11.69 TYPE 2 DIABETES MELLITUS WITH OTHER SPECIFIED COMPLICATION, WITHOUT LONG-TERM CURRENT USE OF INSULIN (MULTI): Primary | ICD-10-CM

## 2024-05-01 DIAGNOSIS — Z12.31 ENCOUNTER FOR SCREENING MAMMOGRAM FOR MALIGNANT NEOPLASM OF BREAST: ICD-10-CM

## 2024-05-01 PROCEDURE — 77063 BREAST TOMOSYNTHESIS BI: CPT | Performed by: RADIOLOGY

## 2024-05-01 PROCEDURE — 77067 SCR MAMMO BI INCL CAD: CPT | Performed by: RADIOLOGY

## 2024-05-01 PROCEDURE — 77067 SCR MAMMO BI INCL CAD: CPT

## 2024-05-01 NOTE — PROGRESS NOTES
"Pharmacist Clinic: Diabetes Management  Shola Osorio is a 58 y.o. female was referred to Clinical Pharmacy Team for diabetes management.     Referring Provider: Aldo Saravia MD     HISTORY OF PRESENT ILLNESS  Approximate Date of Diagnosis: diagnosed in 2012; historically A1c has never been below 7.9%   Known complications due to diabetes included chronic kidney disease and obesity    Diet:   - 3 meals per day   - Patient has been working on eating healthier, however at times it is not that easy if money is tight    LAB REVIEW   Glucose (mg/dL)   Date Value   01/10/2024 223 (H)   10/09/2023 152 (H)   07/05/2023 270 (H)   05/06/2022 274 (H)   08/18/2021 242 (H)     Hemoglobin A1C (%)   Date Value   04/03/2024 8.8 (H)   01/10/2024 9.3 (H)   10/09/2023 9.4 (H)     Bicarbonate (mmol/L)   Date Value   01/10/2024 30   10/09/2023 32   07/05/2023 28   05/06/2022 30   08/18/2021 30     Urea Nitrogen (mg/dL)   Date Value   01/10/2024 14   10/09/2023 16   07/05/2023 16   05/06/2022 13   08/18/2021 11     Creatinine (mg/dL)   Date Value   01/10/2024 0.77   10/09/2023 0.86   07/05/2023 0.97   05/06/2022 0.90   08/18/2021 0.80     Lab Results   Component Value Date    HGBA1C 8.8 (H) 04/03/2024    HGBA1C 9.3 (H) 01/10/2024    HGBA1C 9.4 (H) 10/09/2023     Lab Results   Component Value Date    CHOL 159 04/03/2024    CHOL 165 03/30/2023    CHOL 151 07/29/2022     Lab Results   Component Value Date    HDL 39.0 04/03/2024    HDL 40.4 03/30/2023    HDL 37.6 (A) 07/29/2022     No results found for: \"LDLCALC\"  Lab Results   Component Value Date    TRIG 127 03/30/2023    TRIG 107 07/29/2022    TRIG 122 08/18/2021       DIABETES ASSESSMENT    CURRENT PHARMACOTHERAPY  - metformin 1000 mg twice daily   - farxiga 10 mg once daily   - trulicity 0.75 mg once weekly (Thursday)     SECONDARY PREVENTION  - Statin? Yes  - ACE-I/ARB? Yes    HISTORICAL PHARMACOTHERAPY   - glipizide 5 mg twice daily     SMBG MEASUREMENTS: patient is part of the " [facility] program and uses their meter   - this AM patient saw 90 and it scared her     DISCUSSION:   Discussed opportunity to use CGM to help better understand BG readings throughout the day  Counseled patient on the freestyle yomaira   Answered all questions and concerns   Patient administered her first dose of trulicity   She experienced GI upset but would like to continue on the medication   Counseled pt, answered all questions and concerns    RECOMMENDATIONS/PLAN  1. Patients diabetes is poorly controlled with most recent A1c of 8.8 % (goal < 7 %).   - Continue all meds under the continuation of care with the referring provider and clinical pharmacy team.   - Start using freestyle yomaira 3 to test your blood glucose.     Clinical Pharmacist follow up: 1 week    Thank you,  Mary Ann Goyal, PharmD    Verbal consent to manage patient's drug therapy was obtained from the patient. They were informed they may decline to participate or withdraw from participation in pharmacy services at any time.

## 2024-05-08 ENCOUNTER — TELEMEDICINE (OUTPATIENT)
Dept: PHARMACY | Facility: HOSPITAL | Age: 59
End: 2024-05-08
Payer: COMMERCIAL

## 2024-05-08 DIAGNOSIS — E11.69 TYPE 2 DIABETES MELLITUS WITH OTHER SPECIFIED COMPLICATION, WITHOUT LONG-TERM CURRENT USE OF INSULIN (MULTI): Primary | ICD-10-CM

## 2024-05-08 NOTE — PROGRESS NOTES
"Pharmacist Clinic: Diabetes Management  Shola Osorio is a 58 y.o. female was referred to Clinical Pharmacy Team for diabetes management.     Referring Provider: Aldo Saravia MD     HISTORY OF PRESENT ILLNESS  Approximate Date of Diagnosis: diagnosed in 2012; historically A1c has never been below 7.9%   Known complications due to diabetes included chronic kidney disease and obesity    Diet:   - 3 meals per day   - Patient has been working on eating healthier, however at times it is not that easy if money is tight    LAB REVIEW   Glucose (mg/dL)   Date Value   01/10/2024 223 (H)   10/09/2023 152 (H)   07/05/2023 270 (H)   05/06/2022 274 (H)   08/18/2021 242 (H)     Hemoglobin A1C (%)   Date Value   04/03/2024 8.8 (H)   01/10/2024 9.3 (H)   10/09/2023 9.4 (H)     Bicarbonate (mmol/L)   Date Value   01/10/2024 30   10/09/2023 32   07/05/2023 28   05/06/2022 30   08/18/2021 30     Urea Nitrogen (mg/dL)   Date Value   01/10/2024 14   10/09/2023 16   07/05/2023 16   05/06/2022 13   08/18/2021 11     Creatinine (mg/dL)   Date Value   01/10/2024 0.77   10/09/2023 0.86   07/05/2023 0.97   05/06/2022 0.90   08/18/2021 0.80     Lab Results   Component Value Date    HGBA1C 8.8 (H) 04/03/2024    HGBA1C 9.3 (H) 01/10/2024    HGBA1C 9.4 (H) 10/09/2023     Lab Results   Component Value Date    CHOL 159 04/03/2024    CHOL 165 03/30/2023    CHOL 151 07/29/2022     Lab Results   Component Value Date    HDL 39.0 04/03/2024    HDL 40.4 03/30/2023    HDL 37.6 (A) 07/29/2022     No results found for: \"LDLCALC\"  Lab Results   Component Value Date    TRIG 127 03/30/2023    TRIG 107 07/29/2022    TRIG 122 08/18/2021       DIABETES ASSESSMENT    CURRENT PHARMACOTHERAPY  - metformin 1000 mg twice daily   - farxiga 10 mg once daily   - trulicity 0.75 mg once weekly (Thursday)     SECONDARY PREVENTION  - Statin? Yes  - ACE-I/ARB? Yes    HISTORICAL PHARMACOTHERAPY   - glipizide 5 mg twice daily     SMBG MEASUREMENTS: patient is part of the " [facility] program and uses their meter   - 150 range    DISCUSSION:   Discussed opportunity to use CGM to help better understand BG readings throughout the day  Counseled patient on the freestyle yomaira   Answered all questions and concerns   Patient is discouraged by trulicity  She reports she is experiencing mild GI upset and minimal blood glucose lowering   Discussed we can continue the medication for 2 more weeks and assess for dosage increase and potentially see the benefits of glucose lowering with the higher dose    RECOMMENDATIONS/PLAN  1. Patients diabetes is poorly controlled with most recent A1c of 8.8 % (goal < 7 %).   - Continue all meds under the continuation of care with the referring provider and clinical pharmacy team.   - Start using freestyle yomaira 3 to test your blood glucose.     Clinical Pharmacist follow up: 2 weeks     Thank you,  Mary Ann Goyal, PharmD    Verbal consent to manage patient's drug therapy was obtained from the patient. They were informed they may decline to participate or withdraw from participation in pharmacy services at any time.

## 2024-05-13 ENCOUNTER — OFFICE VISIT (OUTPATIENT)
Dept: PRIMARY CARE | Facility: CLINIC | Age: 59
End: 2024-05-13
Payer: COMMERCIAL

## 2024-05-13 VITALS
HEIGHT: 63 IN | RESPIRATION RATE: 18 BRPM | DIASTOLIC BLOOD PRESSURE: 80 MMHG | BODY MASS INDEX: 40.26 KG/M2 | WEIGHT: 227.2 LBS | HEART RATE: 82 BPM | SYSTOLIC BLOOD PRESSURE: 134 MMHG

## 2024-05-13 DIAGNOSIS — E08.00 DIABETES MELLITUS DUE TO UNDERLYING CONDITION WITH HYPEROSMOLARITY WITHOUT COMA, UNSPECIFIED WHETHER LONG TERM INSULIN USE (MULTI): Primary | ICD-10-CM

## 2024-05-13 DIAGNOSIS — I10 BENIGN ESSENTIAL HYPERTENSION: ICD-10-CM

## 2024-05-13 PROCEDURE — 3062F POS MACROALBUMINURIA REV: CPT | Performed by: INTERNAL MEDICINE

## 2024-05-13 PROCEDURE — 4010F ACE/ARB THERAPY RXD/TAKEN: CPT | Performed by: INTERNAL MEDICINE

## 2024-05-13 PROCEDURE — 3079F DIAST BP 80-89 MM HG: CPT | Performed by: INTERNAL MEDICINE

## 2024-05-13 PROCEDURE — 1036F TOBACCO NON-USER: CPT | Performed by: INTERNAL MEDICINE

## 2024-05-13 PROCEDURE — 3049F LDL-C 100-129 MG/DL: CPT | Performed by: INTERNAL MEDICINE

## 2024-05-13 PROCEDURE — 3075F SYST BP GE 130 - 139MM HG: CPT | Performed by: INTERNAL MEDICINE

## 2024-05-13 PROCEDURE — 3052F HG A1C>EQUAL 8.0%<EQUAL 9.0%: CPT | Performed by: INTERNAL MEDICINE

## 2024-05-13 PROCEDURE — 99214 OFFICE O/P EST MOD 30 MIN: CPT | Performed by: INTERNAL MEDICINE

## 2024-05-13 RX ORDER — DULAGLUTIDE 1.5 MG/.5ML
1.5 INJECTION, SOLUTION SUBCUTANEOUS
Qty: 6 ML | Refills: 1 | Status: SHIPPED | OUTPATIENT
Start: 2024-05-19

## 2024-05-13 RX ORDER — DULAGLUTIDE 1.5 MG/.5ML
1.5 INJECTION, SOLUTION SUBCUTANEOUS
Qty: 2 ML | Refills: 11 | Status: SHIPPED | OUTPATIENT
Start: 2024-05-19 | End: 2024-05-13 | Stop reason: SDUPTHER

## 2024-05-13 ASSESSMENT — ENCOUNTER SYMPTOMS
LOSS OF SENSATION IN FEET: 0
DEPRESSION: 0
OCCASIONAL FEELINGS OF UNSTEADINESS: 0

## 2024-05-13 NOTE — ASSESSMENT & PLAN NOTE
Last A1c 8.8  Since last visit glipizide has been discontinued  Patient is now on Trulicity 0.75 mg weekly she did have some GI side effects nausea vomiting in the first week or 2 of taking it but this has improved per patient  She is also taking metformin 1000 mg twice daily instead of once daily she is tolerating metformin well now in the past she was having diarrhea  Increase Trulicity to 1.5 mg weekly  Continue metformin 1000 mg twice daily and Farxiga 10 mg daily  Repeat A1c before next visit in 4 weeks

## 2024-05-13 NOTE — PROGRESS NOTES
"Subjective   Patient ID: Shola Osorio is a 58 y.o. female who presents for Follow-up.    HPI   She presents for follow-up after recently starting Trulicity.  She did have nausea vomiting in the first week or 2 but this seems to have subsided.  She is doing a lot better on metformin than she did in the past when she had diarrhea    Review of Systems  12 point ROS completed, negative except for mentioned in HPI      Objective   /80   Pulse 82   Resp 18   Ht 1.6 m (5' 3\")   Wt 103 kg (227 lb 3.2 oz)   BMI 40.25 kg/m²     Physical Exam  Cardiovascular:      Rate and Rhythm: Normal rate and regular rhythm.      Heart sounds: Normal heart sounds.   Pulmonary:      Effort: Pulmonary effort is normal.      Breath sounds: Normal breath sounds and air entry.         Assessment/Plan   Problem List Items Addressed This Visit             ICD-10-CM    Benign essential hypertension I10     -BP above target goal 130/80  -Continue spironolactone-HCTZ  Continue metoprolol 100 mg daily, amlodipine 10 mg daily and losartan 50 mg daily  -Keep BP log  -Low sodium diet           Diabetes mellitus (Multi) - Primary E11.9     Last A1c 8.8  Since last visit glipizide has been discontinued  Patient is now on Trulicity 0.75 mg weekly she did have some GI side effects nausea vomiting in the first week or 2 of taking it but this has improved per patient  She is also taking metformin 1000 mg twice daily instead of once daily she is tolerating metformin well now in the past she was having diarrhea  Increase Trulicity to 1.5 mg weekly  Continue metformin 1000 mg twice daily and Farxiga 10 mg daily  Repeat A1c before next visit in 4 weeks         Relevant Medications    dulaglutide (Trulicity) 1.5 mg/0.5 mL pen injector injection (Start on 5/19/2024)    Other Relevant Orders    Hemoglobin A1C   RTC in 4 weeks        "

## 2024-05-20 ENCOUNTER — APPOINTMENT (OUTPATIENT)
Dept: PRIMARY CARE | Facility: CLINIC | Age: 59
End: 2024-05-20
Payer: COMMERCIAL

## 2024-05-22 ENCOUNTER — TELEMEDICINE (OUTPATIENT)
Dept: PHARMACY | Facility: HOSPITAL | Age: 59
End: 2024-05-22
Payer: COMMERCIAL

## 2024-05-22 DIAGNOSIS — E11.69 TYPE 2 DIABETES MELLITUS WITH OTHER SPECIFIED COMPLICATION, WITHOUT LONG-TERM CURRENT USE OF INSULIN (MULTI): Primary | ICD-10-CM

## 2024-05-22 NOTE — PROGRESS NOTES
"Pharmacist Clinic: Diabetes Management  Shola Osorio is a 58 y.o. female was referred to Clinical Pharmacy Team for diabetes management.     Referring Provider: Aldo Saravia MD     HISTORY OF PRESENT ILLNESS  Approximate Date of Diagnosis: diagnosed in 2012; historically A1c has never been below 7.9%   Known complications due to diabetes included chronic kidney disease and obesity    Diet:   - 3 meals per day   - Patient has been working on eating healthier, however at times it is not that easy if money is tight    LAB REVIEW   Glucose (mg/dL)   Date Value   01/10/2024 223 (H)   10/09/2023 152 (H)   07/05/2023 270 (H)   05/06/2022 274 (H)   08/18/2021 242 (H)     Hemoglobin A1C (%)   Date Value   04/03/2024 8.8 (H)   01/10/2024 9.3 (H)   10/09/2023 9.4 (H)     Bicarbonate (mmol/L)   Date Value   01/10/2024 30   10/09/2023 32   07/05/2023 28   05/06/2022 30   08/18/2021 30     Urea Nitrogen (mg/dL)   Date Value   01/10/2024 14   10/09/2023 16   07/05/2023 16   05/06/2022 13   08/18/2021 11     Creatinine (mg/dL)   Date Value   01/10/2024 0.77   10/09/2023 0.86   07/05/2023 0.97   05/06/2022 0.90   08/18/2021 0.80     Lab Results   Component Value Date    HGBA1C 8.8 (H) 04/03/2024    HGBA1C 9.3 (H) 01/10/2024    HGBA1C 9.4 (H) 10/09/2023     Lab Results   Component Value Date    CHOL 159 04/03/2024    CHOL 165 03/30/2023    CHOL 151 07/29/2022     Lab Results   Component Value Date    HDL 39.0 04/03/2024    HDL 40.4 03/30/2023    HDL 37.6 (A) 07/29/2022     No results found for: \"LDLCALC\"  Lab Results   Component Value Date    TRIG 127 03/30/2023    TRIG 107 07/29/2022    TRIG 122 08/18/2021       DIABETES ASSESSMENT    CURRENT PHARMACOTHERAPY  - metformin 1000 mg twice daily   - farxiga 10 mg once daily   - trulicity 0.75 mg once weekly (Thursday)     SECONDARY PREVENTION  - Statin? Yes  - ACE-I/ARB? Yes    HISTORICAL PHARMACOTHERAPY   - glipizide 5 mg twice daily     SMBG MEASUREMENTS: patient is part of the " [facility] program and uses their meter   - 150 range    DISCUSSION:   Discussed opportunity to use CGM to help better understand BG readings throughout the day  Counseled patient on the freestyle yomaira   Answered all questions and concerns   Patient is tolerating trulicity 0.75 mg once weekly   Discussed increasing the dosage to 1.5 mg once weekly   Counseled patient, answered all questions and concerns     RECOMMENDATIONS/PLAN  1. Patients diabetes is poorly controlled with most recent A1c of 8.8 % (goal < 7 %).   - Continue all meds under the continuation of care with the referring provider and clinical pharmacy team.   - Increase trulicity to 1.5 mg under the skin once weekly.     Clinical Pharmacist follow up: 3 weeks     Thank you,  Mary Ann Goyal, PharmD    Verbal consent to manage patient's drug therapy was obtained from the patient. They were informed they may decline to participate or withdraw from participation in pharmacy services at any time.

## 2024-06-13 ENCOUNTER — APPOINTMENT (OUTPATIENT)
Dept: PHARMACY | Facility: HOSPITAL | Age: 59
End: 2024-06-13
Payer: COMMERCIAL

## 2024-06-13 DIAGNOSIS — E11.69 TYPE 2 DIABETES MELLITUS WITH OTHER SPECIFIED COMPLICATION, WITHOUT LONG-TERM CURRENT USE OF INSULIN (MULTI): Primary | ICD-10-CM

## 2024-06-14 RX ORDER — DULAGLUTIDE 3 MG/.5ML
3 INJECTION, SOLUTION SUBCUTANEOUS
Qty: 2 ML | Refills: 5 | Status: SHIPPED | OUTPATIENT
Start: 2024-06-16

## 2024-06-14 NOTE — PROGRESS NOTES
"Pharmacist Clinic: Diabetes Management  Shola Osorio is a 58 y.o. female was referred to Clinical Pharmacy Team for diabetes management.     Referring Provider: Aldo Saravia MD     HISTORY OF PRESENT ILLNESS  Approximate Date of Diagnosis: diagnosed in 2012; historically A1c has never been below 7.9%   Known complications due to diabetes included chronic kidney disease and obesity    Diet:   - 3 meals per day   - Patient has been working on eating healthier, however at times it is not that easy if money is tight    LAB REVIEW   Glucose (mg/dL)   Date Value   01/10/2024 223 (H)   10/09/2023 152 (H)   07/05/2023 270 (H)   05/06/2022 274 (H)   08/18/2021 242 (H)     Hemoglobin A1C (%)   Date Value   04/03/2024 8.8 (H)   01/10/2024 9.3 (H)   10/09/2023 9.4 (H)     Bicarbonate (mmol/L)   Date Value   01/10/2024 30   10/09/2023 32   07/05/2023 28   05/06/2022 30   08/18/2021 30     Urea Nitrogen (mg/dL)   Date Value   01/10/2024 14   10/09/2023 16   07/05/2023 16   05/06/2022 13   08/18/2021 11     Creatinine (mg/dL)   Date Value   01/10/2024 0.77   10/09/2023 0.86   07/05/2023 0.97   05/06/2022 0.90   08/18/2021 0.80     Lab Results   Component Value Date    HGBA1C 8.8 (H) 04/03/2024    HGBA1C 9.3 (H) 01/10/2024    HGBA1C 9.4 (H) 10/09/2023     Lab Results   Component Value Date    CHOL 159 04/03/2024    CHOL 165 03/30/2023    CHOL 151 07/29/2022     Lab Results   Component Value Date    HDL 39.0 04/03/2024    HDL 40.4 03/30/2023    HDL 37.6 (A) 07/29/2022     No results found for: \"LDLCALC\"  Lab Results   Component Value Date    TRIG 127 03/30/2023    TRIG 107 07/29/2022    TRIG 122 08/18/2021       DIABETES ASSESSMENT    CURRENT PHARMACOTHERAPY  - metformin 1000 mg twice daily   - farxiga 10 mg once daily   - trulicity 1.5 mg once weekly (Thursday)     SECONDARY PREVENTION  - Statin? Yes  - ACE-I/ARB? Yes    HISTORICAL PHARMACOTHERAPY   - glipizide 5 mg twice daily     SMBG MEASUREMENTS: patient is part of the " [facility] program and uses their meter   - 150 range    DISCUSSION:   Discussed opportunity to use CGM to help better understand BG readings throughout the day  Counseled patient on the freestyle yomaira   Answered all questions and concerns   Patient is tolerating trulicity 1.5 mg once weekly   Discussed increasing the dosage to 3 mg once weekly   Counseled patient, answered all questions and concerns     RECOMMENDATIONS/PLAN  1. Patients diabetes is poorly controlled with most recent A1c of 8.8 % (goal < 7 %).   - Continue all meds under the continuation of care with the referring provider and clinical pharmacy team.   - Increase trulicity to 3 mg under the skin once weekly.     Clinical Pharmacist follow up: 4 weeks     Thank you,  Mary Ann Goyal, PharmD    Verbal consent to manage patient's drug therapy was obtained from the patient. They were informed they may decline to participate or withdraw from participation in pharmacy services at any time.

## 2024-06-17 ENCOUNTER — APPOINTMENT (OUTPATIENT)
Dept: PODIATRY | Facility: HOSPITAL | Age: 59
End: 2024-06-17
Payer: COMMERCIAL

## 2024-06-19 DIAGNOSIS — E11.9 TYPE 2 DIABETES MELLITUS WITHOUT COMPLICATION, WITH LONG-TERM CURRENT USE OF INSULIN (MULTI): ICD-10-CM

## 2024-06-19 DIAGNOSIS — I10 BENIGN ESSENTIAL HYPERTENSION: ICD-10-CM

## 2024-06-19 DIAGNOSIS — E11.9 TYPE 2 DIABETES MELLITUS WITHOUT COMPLICATION, WITHOUT LONG-TERM CURRENT USE OF INSULIN (MULTI): ICD-10-CM

## 2024-06-19 DIAGNOSIS — E78.2 MIXED HYPERLIPIDEMIA: ICD-10-CM

## 2024-06-19 DIAGNOSIS — Z79.4 TYPE 2 DIABETES MELLITUS WITHOUT COMPLICATION, WITH LONG-TERM CURRENT USE OF INSULIN (MULTI): ICD-10-CM

## 2024-06-19 RX ORDER — ATORVASTATIN CALCIUM 20 MG/1
20 TABLET, FILM COATED ORAL DAILY
Qty: 90 TABLET | Refills: 0 | Status: SHIPPED | OUTPATIENT
Start: 2024-06-19

## 2024-06-19 RX ORDER — SPIRONOLACTONE AND HYDROCHLOROTHIAZIDE 25; 25 MG/1; MG/1
1 TABLET ORAL DAILY
Qty: 90 TABLET | Refills: 0 | Status: SHIPPED | OUTPATIENT
Start: 2024-06-19

## 2024-06-19 RX ORDER — DAPAGLIFLOZIN 10 MG/1
10 TABLET, FILM COATED ORAL EVERY MORNING
Qty: 90 TABLET | Refills: 0 | Status: SHIPPED | OUTPATIENT
Start: 2024-06-19

## 2024-06-19 RX ORDER — AMLODIPINE BESYLATE 10 MG/1
10 TABLET ORAL DAILY
Qty: 90 TABLET | Refills: 0 | Status: SHIPPED | OUTPATIENT
Start: 2024-06-19

## 2024-06-19 RX ORDER — METFORMIN HYDROCHLORIDE 1000 MG/1
1000 TABLET ORAL
Qty: 180 TABLET | Refills: 0 | Status: SHIPPED | OUTPATIENT
Start: 2024-06-19 | End: 2024-09-17

## 2024-06-19 RX ORDER — METOPROLOL SUCCINATE 100 MG/1
100 TABLET, EXTENDED RELEASE ORAL DAILY
Qty: 90 TABLET | Refills: 0 | Status: SHIPPED | OUTPATIENT
Start: 2024-06-19

## 2024-06-19 RX ORDER — LOSARTAN POTASSIUM 50 MG/1
50 TABLET ORAL DAILY
Qty: 90 TABLET | Refills: 0 | Status: SHIPPED | OUTPATIENT
Start: 2024-06-19

## 2024-07-02 ENCOUNTER — APPOINTMENT (OUTPATIENT)
Dept: PRIMARY CARE | Facility: CLINIC | Age: 59
End: 2024-07-02
Payer: COMMERCIAL

## 2024-07-11 ENCOUNTER — APPOINTMENT (OUTPATIENT)
Dept: PHARMACY | Facility: HOSPITAL | Age: 59
End: 2024-07-11
Payer: COMMERCIAL

## 2024-08-06 ENCOUNTER — APPOINTMENT (OUTPATIENT)
Dept: OPHTHALMOLOGY | Facility: CLINIC | Age: 59
End: 2024-08-06
Payer: COMMERCIAL

## 2024-08-06 DIAGNOSIS — H52.4 HYPEROPIA WITH PRESBYOPIA OF BOTH EYES: ICD-10-CM

## 2024-08-06 DIAGNOSIS — E11.3293 TYPE 2 DIABETES MELLITUS WITH MILD NONPROLIFERATIVE RETINOPATHY OF BOTH EYES WITHOUT MACULAR EDEMA, UNSPECIFIED WHETHER LONG TERM INSULIN USE (MULTI): Primary | ICD-10-CM

## 2024-08-06 DIAGNOSIS — E11.9 TYPE 2 DIABETES MELLITUS WITHOUT COMPLICATION, WITHOUT LONG-TERM CURRENT USE OF INSULIN (MULTI): ICD-10-CM

## 2024-08-06 DIAGNOSIS — H25.13 AGE-RELATED NUCLEAR CATARACT OF BOTH EYES: ICD-10-CM

## 2024-08-06 DIAGNOSIS — H52.03 HYPEROPIA WITH PRESBYOPIA OF BOTH EYES: ICD-10-CM

## 2024-08-06 PROCEDURE — 92015 DETERMINE REFRACTIVE STATE: CPT | Performed by: STUDENT IN AN ORGANIZED HEALTH CARE EDUCATION/TRAINING PROGRAM

## 2024-08-06 PROCEDURE — 92004 COMPRE OPH EXAM NEW PT 1/>: CPT | Performed by: STUDENT IN AN ORGANIZED HEALTH CARE EDUCATION/TRAINING PROGRAM

## 2024-08-06 ASSESSMENT — ENCOUNTER SYMPTOMS
MUSCULOSKELETAL NEGATIVE: 0
ENDOCRINE NEGATIVE: 0
NEUROLOGICAL NEGATIVE: 0
CARDIOVASCULAR NEGATIVE: 0
PSYCHIATRIC NEGATIVE: 0
ALLERGIC/IMMUNOLOGIC NEGATIVE: 0
EYES NEGATIVE: 0
GASTROINTESTINAL NEGATIVE: 0
HEMATOLOGIC/LYMPHATIC NEGATIVE: 0
CONSTITUTIONAL NEGATIVE: 0
RESPIRATORY NEGATIVE: 0

## 2024-08-06 ASSESSMENT — TONOMETRY
IOP_METHOD: GOLDMANN APPLANATION
OS_IOP_MMHG: 16
OD_IOP_MMHG: 16

## 2024-08-06 ASSESSMENT — CONF VISUAL FIELD
OS_SUPERIOR_TEMPORAL_RESTRICTION: 0
OS_NORMAL: 1
OS_INFERIOR_NASAL_RESTRICTION: 0
OD_NORMAL: 1
METHOD: COUNTING FINGERS
OD_INFERIOR_NASAL_RESTRICTION: 0
OS_INFERIOR_TEMPORAL_RESTRICTION: 0
OD_SUPERIOR_NASAL_RESTRICTION: 0
OD_SUPERIOR_TEMPORAL_RESTRICTION: 0
OS_SUPERIOR_NASAL_RESTRICTION: 0
OD_INFERIOR_TEMPORAL_RESTRICTION: 0

## 2024-08-06 ASSESSMENT — REFRACTION_MANIFEST
OD_ADD: +2.25
OD_CYLINDER: -0.50
OS_SPHERE: +0.25
OS_CYLINDER: SPHERE
OD_AXIS: 094
OS_ADD: +2.25
OD_SPHERE: +0.75

## 2024-08-06 ASSESSMENT — SLIT LAMP EXAM - LIDS
COMMENTS: GOOD POSITION
COMMENTS: GOOD POSITION

## 2024-08-06 ASSESSMENT — CUP TO DISC RATIO
OS_RATIO: .40
OD_RATIO: .40

## 2024-08-06 ASSESSMENT — VISUAL ACUITY
METHOD: SNELLEN - LINEAR
OD_SC: 20/40
OS_SC: 20/25

## 2024-08-06 ASSESSMENT — EXTERNAL EXAM - RIGHT EYE: OD_EXAM: NORMAL

## 2024-08-06 ASSESSMENT — EXTERNAL EXAM - LEFT EYE: OS_EXAM: NORMAL

## 2024-08-06 NOTE — PROGRESS NOTES
Assessment/Plan   Diagnoses and all orders for this visit:  Type 2 diabetes mellitus with mild nonproliferative retinopathy of both eyes without macular edema  -mild retinopathy observed on exam today od/os, pt ed to continue good BGlc, blood pressure and lipid control, rtc with any changes in vision, otherwise monitor 6 months  Hyperopia with presbyopia of both eyes  Age-related nuclear cataract of both eyes  -New spec rx released today per patient request. Monitor 1 year or sooner prn. Refraction billed today.  -BCVA 20/20 OD+OS-cataracts are non visually significant; pt ed. Monitor    RTC 6 months for DFE and OCT MAC to monitor for mild retinopathy

## 2024-08-14 ENCOUNTER — LAB (OUTPATIENT)
Dept: LAB | Facility: LAB | Age: 59
End: 2024-08-14
Payer: COMMERCIAL

## 2024-08-14 ENCOUNTER — APPOINTMENT (OUTPATIENT)
Dept: PHARMACY | Facility: HOSPITAL | Age: 59
End: 2024-08-14
Payer: COMMERCIAL

## 2024-08-14 DIAGNOSIS — E11.69 TYPE 2 DIABETES MELLITUS WITH OTHER SPECIFIED COMPLICATION, WITHOUT LONG-TERM CURRENT USE OF INSULIN (MULTI): ICD-10-CM

## 2024-08-14 DIAGNOSIS — E11.69 TYPE 2 DIABETES MELLITUS WITH OTHER SPECIFIED COMPLICATION, WITHOUT LONG-TERM CURRENT USE OF INSULIN (MULTI): Primary | ICD-10-CM

## 2024-08-14 LAB
EST. AVERAGE GLUCOSE BLD GHB EST-MCNC: 206 MG/DL
HBA1C MFR BLD: 8.8 %

## 2024-08-14 PROCEDURE — 83036 HEMOGLOBIN GLYCOSYLATED A1C: CPT

## 2024-08-14 PROCEDURE — 36415 COLL VENOUS BLD VENIPUNCTURE: CPT

## 2024-08-14 NOTE — PROGRESS NOTES
"Pharmacist Clinic: Diabetes Management  Shola Osorio is a 58 y.o. female was referred to Clinical Pharmacy Team for diabetes management.     Referring Provider: Aldo Saravia MD     HISTORY OF PRESENT ILLNESS  Approximate Date of Diagnosis: diagnosed in 2012; historically A1c has never been below 7.9%   Known complications due to diabetes included chronic kidney disease and obesity    Diet:   - used to be in the [facility] program, however she stopped because she was never given feedback   - 3 meals per day   - Patient has been working on eating healthier, however at times it is not that easy if money is tight    LAB REVIEW   Glucose (mg/dL)   Date Value   01/10/2024 223 (H)   10/09/2023 152 (H)   07/05/2023 270 (H)   05/06/2022 274 (H)   08/18/2021 242 (H)     Hemoglobin A1C (%)   Date Value   08/14/2024 8.8 (H)   04/03/2024 8.8 (H)   01/10/2024 9.3 (H)     Bicarbonate (mmol/L)   Date Value   01/10/2024 30   10/09/2023 32   07/05/2023 28   05/06/2022 30   08/18/2021 30     Urea Nitrogen (mg/dL)   Date Value   01/10/2024 14   10/09/2023 16   07/05/2023 16   05/06/2022 13   08/18/2021 11     Creatinine (mg/dL)   Date Value   01/10/2024 0.77   10/09/2023 0.86   07/05/2023 0.97   05/06/2022 0.90   08/18/2021 0.80     Lab Results   Component Value Date    HGBA1C 8.8 (H) 08/14/2024    HGBA1C 8.8 (H) 04/03/2024    HGBA1C 9.3 (H) 01/10/2024     Lab Results   Component Value Date    CHOL 159 04/03/2024    CHOL 165 03/30/2023    CHOL 151 07/29/2022     Lab Results   Component Value Date    HDL 39.0 04/03/2024    HDL 40.4 03/30/2023    HDL 37.6 (A) 07/29/2022     No results found for: \"LDLCALC\"  Lab Results   Component Value Date    TRIG 127 03/30/2023    TRIG 107 07/29/2022    TRIG 122 08/18/2021       DIABETES ASSESSMENT    CURRENT PHARMACOTHERAPY  - metformin 1000 mg twice daily   - farxiga 10 mg once daily   - trulicity 1.5 mg once weekly (Thursday)     SECONDARY PREVENTION  - Statin? Yes  - ACE-I/ARB? Yes    HISTORICAL " PHARMACOTHERAPY   - glipizide 5 mg twice daily     SMBG MEASUREMENTS:   - 150 range    DISCUSSION:   Discussed opportunity to use CGM to help better understand BG readings throughout the day  Counseled patient on the freestyle yomaira   Answered all questions and concerns   Patient is tolerating trulicity 1.5 mg once weekly, we discussed going up to 3 mg however she did not  the higher dose   Discussed increasing the dosage to 3 mg once weekly   Counseled patient, answered all questions and concerns     RECOMMENDATIONS/PLAN  1. Patients diabetes is poorly controlled with most recent A1c of 8.8 % (goal < 7 %).   - Continue all meds under the continuation of care with the referring provider and clinical pharmacy team.   - Increase trulicity to 3 mg under the skin once weekly.     Clinical Pharmacist follow up: 4 weeks     Thank you,  Mary Ann Goyal, PharmD    Verbal consent to manage patient's drug therapy was obtained from the patient. They were informed they may decline to participate or withdraw from participation in pharmacy services at any time.

## 2024-08-19 ENCOUNTER — TELEMEDICINE CLINICAL SUPPORT (OUTPATIENT)
Dept: NUTRITION | Facility: HOSPITAL | Age: 59
End: 2024-08-19
Payer: COMMERCIAL

## 2024-08-19 VITALS — BODY MASS INDEX: 39.51 KG/M2 | WEIGHT: 223 LBS | HEIGHT: 63 IN

## 2024-08-19 DIAGNOSIS — E11.69 TYPE 2 DIABETES MELLITUS WITH OTHER SPECIFIED COMPLICATION, WITHOUT LONG-TERM CURRENT USE OF INSULIN (MULTI): Primary | ICD-10-CM

## 2024-08-19 NOTE — PATIENT INSTRUCTIONS
Consistency of meals is important!  Eat 3 meals per day with snacks.  Eat an hour after waking up and don't go more than 3-4 hours without eating.    Keep carbohydrates consistent from meal to meal.  Aim for 30-45g (2-3 servings)of carbohydrate per meal.  Refer to carbohydrate lists for serving sizes.   Read labels- 15g of total carbohydrate is a serving.     - Choose foods that have fiber listed on the labels.  This helps slow down the release of sugar in your bloodstream.    Include a source of protein with all meals and snack such as meat, fish, nuts, peanut butter, yogurt, cottage cheese or eggs.    -Keep high protein snacks on hand at home- see snack handout.  Try to reduce your weight by 1# per week- this can help lower blood sugar as well.     - see 1200 calorie sample meal plans for ideas.    6.  Drink 64 oz of water other non-caloric beverage.  7.  Aim for 30 minutes of exercise such as walking on most days.

## 2024-08-19 NOTE — PROGRESS NOTES
"Nutrition Assessment     Reason for Visit:  Shola Osorio is a 58 y.o. female who presents for nutrition counseling seeking weight loss in the context of type II DM.        Anthropometrics:  Wt Readings from Last 10 Encounters:   08/19/24 101 kg (223 lb)   05/13/24 103 kg (227 lb 3.2 oz)   03/25/24 104 kg (230 lb 3.2 oz)   03/11/24 105 kg (231 lb 7.7 oz)   02/15/24 105 kg (232 lb)   12/11/23 107 kg (236 lb)   09/13/23 107 kg (236 lb)   03/30/23 108 kg (237 lb 11.2 oz)   12/29/22 106 kg (233 lb)   10/20/22 106 kg (233 lb)     Labs: 8/14/24: HgbA1c: 8.8  Meds: Trulicity  Anthropometrics  Height: 160 cm (5' 3\")  Weight: 101 kg (223 lb)     Food And Nutrient Intake:  Food and Nutrient History  Food and Nutrient History: Pt presents for nutrition counseling in the context of type II DM.  HgbA1c has been elevated.  BMI of 39.5 indicates class III obesity.  Pt was started on Trulicity and has begus to gradually lose weight as her appetite has been less.  Diet recall reveals that pt is eating excessive amount of c arbohydrates at some meals.  She also is getting excessive sugar in her beverages.  Pt may benefit from counting carbohydrates and aiming for 30-45g of carbohydrates with meals and increasing protein in her snacks.  A weight loss of 1# per week is recommended.  Fluid Intake: Dr. Pepper- 1x; water;     Food Intake  Meal 1: wheat bread; 2 eggs; oil; prado on bread; coffee creamer and 3 packets of sugar  Meal 2: applesauce, peaches, oatmeal- instant; leftovers; fried foods- chicken  Meal 3: Fried pork chops; salmon, baked potato;       Physical Activities:  Physical Activity  Type of Physical Activity: none currently     Nutrition Focused Physical Exam:  Deferred- no malnutrition suspected     Energy Needs  Calculated Energy Needs Using Equations  Height: 160 cm (5' 3\")  Weight Used for Equation Calculations: 101 kg (223 lb)  Breanna- [place] Qu[place]onez Equation (Overweight or Obese Patients): 1556  Activity Factor: " 1.2  Total Energy Needs: 1867  Estimated Energy Needs  Total Energy Estimated Needs (kCal): 1367 kCal  Method for Estimating Needs: -500 kcals        Nutrition Diagnosis   Malnutrition Diagnosis  Patient has Malnutrition Diagnosis: No  Nutrition Diagnosis  Patient has Nutrition Diagnosis: Yes  Diagnosis Status (1): New  Nutrition Diagnosis 1: Excessive carbohydrate intake  Related to (1): type II DM  As Evidenced by (1): diet recall, pt report; HgbA1C    Nutrition Interventions/Recommendations   Food and Nutrition Delivery  Meals & Snacks: Carbohydrate-modified diet, Energy-modified diet, Fiber-modified diet, General Healthful Diet, Modify schedule of foods/fluids, Protein-modified diet  Goals: 30-45 g carb per meal; 3 meals with 1-2 snacks per day; ~1200 daily kcal intake; ensure adequate protein sources with each meal and snack  Nutrition Education  Nutrition Education Content: Content related nutrition education, Education on nutrition's influence on health, Physical activity guidance  Goals: Instructed on consistent carbohydrate intake, blood sugar goals, exercise, proper portion sizes, label reading, and general healthful nutrition. Instructed on benefits of wt loss with diabetes. Both verbal and written education provided in the one-on-one setting.Pt verbalized understanding of information provided. All questions answered to pt satisfaction throughout visit.        Patient Instructions   Consistency of meals is important!  Eat 3 meals per day with snacks.  Eat an hour after waking up and don't go more than 3-4 hours without eating.    Keep carbohydrates consistent from meal to meal.  Aim for 30-45g (2-3 servings)of carbohydrate per meal.  Refer to carbohydrate lists for serving sizes.   Read labels- 15g of total carbohydrate is a serving.     - Choose foods that have fiber listed on the labels.  This helps slow down the release of sugar in your bloodstream.    Include a source of protein with all meals and  snack such as meat, fish, nuts, peanut butter, yogurt, cottage cheese or eggs.    -Keep high protein snacks on hand at home- see snack handout.  Try to reduce your weight by 1# per week- this can help lower blood sugar as well.     - see 1200 calorie sample meal plans for ideas.    6.  Drink 64 oz of water other non-caloric beverage.  7.  Aim for 30 minutes of exercise such as walking on most days.       Nutrition Monitoring and Evaluation   Food/Nutrient Related History Monitoring  Monitoring and Evaluation Plan: Energy intake, Fluid intake, Meal/snack pattern, Protein intake, Carbohydrate intake, Fiber intake  Criteria: consume 1200 kcal/day  Criteria: aim for 64 oz of water daily, limit sugar sweetened beveraged  Criteria: 3 meals per day with 1-2 snacks between meals  Criteria: ensure adequate protein at each meal and snack ~ 20-30 g/meal  Estimated carbohydrate intake: Estimated carbohydrate intake  Criteria: 30-45 g carb per meal  Criteria: ensure adequate fiber is present at meals - focus on 1/2 plate nonstarchy vegetables and choosing whole grain foods when able  Knowledge/Beliefs/Attitudes  Monitoring and Evaluation Plan: Physical activity  Criteria: Encouraged regular physical activity including strength training as medically appropriate per AHA guidelines  Body Composition/Growth/Weight History  Monitoring and Evaluation Plan: Weight change  Weight Change: Weight loss  Criteria: 1-2 lb wt loss per week  Biochemical Data, Medical Tests and Procedures  Monitoring and Evaluation Plan: Glucose/endocrine profile  Glucose/Endocrine Profile: Hemoglobin A1c (HgbA1c), Glucose, fasting  Criteria: A1c <7%; fasting blood glucose  mg/dl      Readiness to Change : Good  Level of Understanding : Fair  Anticipated Compliant : Good

## 2024-08-26 ENCOUNTER — APPOINTMENT (OUTPATIENT)
Dept: PRIMARY CARE | Facility: CLINIC | Age: 59
End: 2024-08-26
Payer: COMMERCIAL

## 2024-09-12 ENCOUNTER — APPOINTMENT (OUTPATIENT)
Dept: PHARMACY | Facility: HOSPITAL | Age: 59
End: 2024-09-12
Payer: COMMERCIAL

## 2024-09-12 DIAGNOSIS — E11.69 TYPE 2 DIABETES MELLITUS WITH OTHER SPECIFIED COMPLICATION, WITHOUT LONG-TERM CURRENT USE OF INSULIN (MULTI): ICD-10-CM

## 2024-09-12 RX ORDER — DULAGLUTIDE 3 MG/.5ML
3 INJECTION, SOLUTION SUBCUTANEOUS
Qty: 2 ML | Refills: 5 | Status: SHIPPED | OUTPATIENT
Start: 2024-09-15

## 2024-09-12 NOTE — PROGRESS NOTES
"Pharmacist Clinic: Diabetes Management  Shola Osorio is a 58 y.o. female was referred to Clinical Pharmacy Team for diabetes management.     Referring Provider: No primary care provider on file.     HISTORY OF PRESENT ILLNESS  Approximate Date of Diagnosis: diagnosed in 2012; historically A1c has never been below 7.9%   Known complications due to diabetes included chronic kidney disease and obesity    Diet:   - used to be in the [facility] program, however she stopped because she was never given feedback   - 3 meals per day   - Patient has been working on eating healthier, however at times it is not that easy if money is tight    LAB REVIEW   Glucose (mg/dL)   Date Value   01/10/2024 223 (H)   10/09/2023 152 (H)   07/05/2023 270 (H)   05/06/2022 274 (H)   08/18/2021 242 (H)     Hemoglobin A1C (%)   Date Value   08/14/2024 8.8 (H)   04/03/2024 8.8 (H)   01/10/2024 9.3 (H)     Bicarbonate (mmol/L)   Date Value   01/10/2024 30   10/09/2023 32   07/05/2023 28   05/06/2022 30   08/18/2021 30     Urea Nitrogen (mg/dL)   Date Value   01/10/2024 14   10/09/2023 16   07/05/2023 16   05/06/2022 13   08/18/2021 11     Creatinine (mg/dL)   Date Value   01/10/2024 0.77   10/09/2023 0.86   07/05/2023 0.97   05/06/2022 0.90   08/18/2021 0.80     Lab Results   Component Value Date    HGBA1C 8.8 (H) 08/14/2024    HGBA1C 8.8 (H) 04/03/2024    HGBA1C 9.3 (H) 01/10/2024     Lab Results   Component Value Date    CHOL 159 04/03/2024    CHOL 165 03/30/2023    CHOL 151 07/29/2022     Lab Results   Component Value Date    HDL 39.0 04/03/2024    HDL 40.4 03/30/2023    HDL 37.6 (A) 07/29/2022     No results found for: \"LDLCALC\"  Lab Results   Component Value Date    TRIG 127 03/30/2023    TRIG 107 07/29/2022    TRIG 122 08/18/2021       DIABETES ASSESSMENT    CURRENT PHARMACOTHERAPY  - metformin 1000 mg twice daily   - farxiga 10 mg once daily   - trulicity 1.5 mg once weekly (Thursday)     SECONDARY PREVENTION  - Statin? Yes  - ACE-I/ARB? " Yes    HISTORICAL PHARMACOTHERAPY   - glipizide 5 mg twice daily     SMBG MEASUREMENTS:   - 150 range    DISCUSSION:   Discussed opportunity to use CGM to help better understand BG readings throughout the day  Counseled patient on the freestyle yomaira   Answered all questions and concerns   Patient is tolerating trulicity 1.5 mg once weekly, we discussed going up to 3 mg however she did not  the higher dose   Discussed increasing the dosage to 3 mg once weekly   Counseled patient, answered all questions and concerns     RECOMMENDATIONS/PLAN  1. Patients diabetes is poorly controlled with most recent A1c of 8.8 % (goal < 7 %).   - Continue all meds under the continuation of care with the referring provider and clinical pharmacy team.   - Increase trulicity to 3 mg under the skin once weekly.     Clinical Pharmacist follow up: 2 weeks     Thank you,  Mary Ann Goyal, PharmD    Verbal consent to manage patient's drug therapy was obtained from the patient. They were informed they may decline to participate or withdraw from participation in pharmacy services at any time.

## 2024-09-14 DIAGNOSIS — I10 BENIGN ESSENTIAL HYPERTENSION: ICD-10-CM

## 2024-09-14 DIAGNOSIS — E78.2 MIXED HYPERLIPIDEMIA: ICD-10-CM

## 2024-09-22 RX ORDER — ATORVASTATIN CALCIUM 20 MG/1
20 TABLET, FILM COATED ORAL DAILY
Qty: 90 TABLET | Refills: 0 | Status: SHIPPED | OUTPATIENT
Start: 2024-09-22

## 2024-09-22 RX ORDER — AMLODIPINE BESYLATE 10 MG/1
10 TABLET ORAL DAILY
Qty: 90 TABLET | Refills: 0 | Status: SHIPPED | OUTPATIENT
Start: 2024-09-22

## 2024-09-23 ENCOUNTER — APPOINTMENT (OUTPATIENT)
Dept: PRIMARY CARE | Facility: CLINIC | Age: 59
End: 2024-09-23
Payer: COMMERCIAL

## 2024-09-23 VITALS
OXYGEN SATURATION: 96 % | WEIGHT: 216.2 LBS | HEIGHT: 63 IN | DIASTOLIC BLOOD PRESSURE: 77 MMHG | SYSTOLIC BLOOD PRESSURE: 108 MMHG | BODY MASS INDEX: 38.31 KG/M2 | HEART RATE: 94 BPM

## 2024-09-23 DIAGNOSIS — I10 BENIGN ESSENTIAL HYPERTENSION: ICD-10-CM

## 2024-09-23 DIAGNOSIS — E11.9 TYPE 2 DIABETES MELLITUS WITHOUT COMPLICATION, WITH LONG-TERM CURRENT USE OF INSULIN (MULTI): Primary | ICD-10-CM

## 2024-09-23 DIAGNOSIS — E11.9 TYPE 2 DIABETES MELLITUS WITHOUT COMPLICATION, WITHOUT LONG-TERM CURRENT USE OF INSULIN (MULTI): ICD-10-CM

## 2024-09-23 DIAGNOSIS — Z79.4 TYPE 2 DIABETES MELLITUS WITHOUT COMPLICATION, WITH LONG-TERM CURRENT USE OF INSULIN (MULTI): Primary | ICD-10-CM

## 2024-09-23 PROCEDURE — 3052F HG A1C>EQUAL 8.0%<EQUAL 9.0%: CPT | Performed by: STUDENT IN AN ORGANIZED HEALTH CARE EDUCATION/TRAINING PROGRAM

## 2024-09-23 PROCEDURE — 3078F DIAST BP <80 MM HG: CPT | Performed by: STUDENT IN AN ORGANIZED HEALTH CARE EDUCATION/TRAINING PROGRAM

## 2024-09-23 PROCEDURE — 99214 OFFICE O/P EST MOD 30 MIN: CPT | Performed by: STUDENT IN AN ORGANIZED HEALTH CARE EDUCATION/TRAINING PROGRAM

## 2024-09-23 PROCEDURE — 3074F SYST BP LT 130 MM HG: CPT | Performed by: STUDENT IN AN ORGANIZED HEALTH CARE EDUCATION/TRAINING PROGRAM

## 2024-09-23 PROCEDURE — 3062F POS MACROALBUMINURIA REV: CPT | Performed by: STUDENT IN AN ORGANIZED HEALTH CARE EDUCATION/TRAINING PROGRAM

## 2024-09-23 PROCEDURE — 1036F TOBACCO NON-USER: CPT | Performed by: STUDENT IN AN ORGANIZED HEALTH CARE EDUCATION/TRAINING PROGRAM

## 2024-09-23 PROCEDURE — 3049F LDL-C 100-129 MG/DL: CPT | Performed by: STUDENT IN AN ORGANIZED HEALTH CARE EDUCATION/TRAINING PROGRAM

## 2024-09-23 PROCEDURE — 3008F BODY MASS INDEX DOCD: CPT | Performed by: STUDENT IN AN ORGANIZED HEALTH CARE EDUCATION/TRAINING PROGRAM

## 2024-09-23 PROCEDURE — 4010F ACE/ARB THERAPY RXD/TAKEN: CPT | Performed by: STUDENT IN AN ORGANIZED HEALTH CARE EDUCATION/TRAINING PROGRAM

## 2024-09-23 RX ORDER — DAPAGLIFLOZIN 10 MG/1
10 TABLET, FILM COATED ORAL EVERY MORNING
Qty: 90 TABLET | Refills: 0 | Status: SHIPPED | OUTPATIENT
Start: 2024-09-23

## 2024-09-23 RX ORDER — METOPROLOL SUCCINATE 100 MG/1
100 TABLET, EXTENDED RELEASE ORAL DAILY
Qty: 90 TABLET | Refills: 0 | Status: SHIPPED | OUTPATIENT
Start: 2024-09-23

## 2024-09-23 RX ORDER — SPIRONOLACTONE AND HYDROCHLOROTHIAZIDE 25; 25 MG/1; MG/1
1 TABLET ORAL DAILY
Qty: 90 TABLET | Refills: 0 | Status: SHIPPED | OUTPATIENT
Start: 2024-09-23

## 2024-09-23 RX ORDER — METFORMIN HYDROCHLORIDE 1000 MG/1
1000 TABLET ORAL
Qty: 180 TABLET | Refills: 0 | Status: SHIPPED | OUTPATIENT
Start: 2024-09-23 | End: 2024-12-22

## 2024-09-23 RX ORDER — LOSARTAN POTASSIUM 50 MG/1
50 TABLET ORAL DAILY
Qty: 90 TABLET | Refills: 0 | Status: SHIPPED | OUTPATIENT
Start: 2024-09-23

## 2024-09-23 ASSESSMENT — ENCOUNTER SYMPTOMS
LOSS OF SENSATION IN FEET: 0
OCCASIONAL FEELINGS OF UNSTEADINESS: 0
DEPRESSION: 0

## 2024-09-23 NOTE — PROGRESS NOTES
"Subjective   Patient ID: Shola Osorio is a 58 y.o. female who presents for New Patient Visit (Discuss medication refills not changing doctors. Doctor is on leave. Pt declined flu shot.).        HPI    Pt of Dr. Saravia , who is reportedly out of office   Pt not changing doctors at this time     Diabetes concerns   No change in recent A1c frm April - August   11lbs weight loss since may 2024   Gets hungry once a day   She was on metformin + Glipizide and reportedly called the pharmacist with drop in bG to 90 with no sx , thereafter she received a call abt change of her meds to Trulicity     Pt is here to speak to a physician of the above changes         Visit Vitals  /77   Pulse 94   Ht 1.6 m (5' 3\")   Wt 98.1 kg (216 lb 3.2 oz)   SpO2 96%   BMI 38.30 kg/m²   OB Status Postmenopausal   Smoking Status Former   BSA 2.09 m²      No LMP recorded. Patient is postmenopausal.   Current Outpatient Medications   Medication Instructions    amLODIPine (NORVASC) 10 mg, oral, Daily    atorvastatin (LIPITOR) 20 mg, oral, Daily    blood sugar diagnostic (True Metrix Glucose Test Strip) strip testing bid    dapagliflozin propanediol (FARXIGA) 10 mg, oral, Every morning    lancets misc miscellaneous    losartan (COZAAR) 50 mg, oral, Daily    metFORMIN (GLUCOPHAGE) 1,000 mg, oral, 2 times daily (morning and late afternoon)    metoprolol succinate XL (TOPROL-XL) 100 mg, oral, Daily    spironolacton-hydrochlorothiaz (Aldactazide) 25-25 mg tablet 25 mg, oral, Daily    Trulicity 3 mg, subcutaneous, Once Weekly      Social History     Tobacco Use    Smoking status: Former     Types: Cigarettes    Smokeless tobacco: Never   Substance Use Topics    Alcohol use: Never        Review of Systems    Constitutional : No feeling poorly / fevers/ chills / night sweats/ fatigue   Cardiovascular : No CP /Palpitations/ lower extremity edema / syncope   Respiratory : No Cough /DUPONT/Dyspnea at rest   Gastrointestinal : No abd pain / N/V  No " bloody stools/ melena / constipation  Endo : No polyuria/polydipsia/ muscle weakness / sluggishness   CNS: No confusion / HA/ tingling/ numbness/ weakness of extremities  Psychiatric: No anxiety/ depression/ SI/HI    All other systems have been reviewed and are negative for complaint       Physical Exam    Constitutional : Vitals reviewed. Alert and in no distress  Cardiovascular : RRR, Normal S1, S2, No pericardial rub/ gallop, no peripheral edema   Pulmonary: No respiratory distress, CTAB   MSK : Normal gait and station , strength and tone   Skin: Warm to touch ,  normal skin turgor   Neurologic : CNs 2-12 grossly intact , no obvious FNDs  Psych : A,Ox3, normal mood and affect      Assessment/Plan   Diagnoses and all orders for this visit:  Type 2 diabetes mellitus without complication, with long-term current use of insulin (Multi)  -     Hemoglobin A1C; Future  -     dapagliflozin propanediol (Farxiga) 10 mg; Take 1 tablet (10 mg) by mouth once daily in the morning.  Benign essential hypertension  -     spironolacton-hydrochlorothiaz (Aldactazide) 25-25 mg tablet; Take 1 tablet (25 mg) by mouth once daily.  -     metoprolol succinate XL (Toprol-XL) 100 mg 24 hr tablet; Take 1 tablet (100 mg) by mouth once daily.  -     losartan (Cozaar) 50 mg tablet; Take 1 tablet (50 mg) by mouth once daily.  Type 2 diabetes mellitus without complication, without long-term current use of insulin (Multi)  -     metFORMIN (Glucophage) 1,000 mg tablet; Take 1 tablet (1,000 mg) by mouth 2 times daily (morning and late afternoon).        DM2 :   I cannot explain the reasons behind change in regimen as I was not involved   Pt is clearly upset and translated those frustrations on to me today   I suggested that she can continue staying on this regimen , especially with recent increase in Trulicity to 3mg / week and rpt A1c in Nov   Rpt A1c has been ordered   If A1c in Nov does not change, we can  go back to metformin + Glipizide  +/_  one of these 2 meds ( Farxiga. Trulicity )   She was told that Dr. Saravia might be available in the office in Oct             Conditions addressed and mgmt as noted above.  Pertinent labs, images/ imaging reports , chart review was done .   Age appropriate labs / labs for mgmt of chronic medical conditions ordered, further mgmt pending the results.       This note is intended for the physician writing it, as well as to communicate findings to other healthcare professionals. These notes use medical lexicon that may be misunderstood by non medical persons. Therefore, interpretations of medical notes and terminology should be approached with caution.

## 2024-09-26 ENCOUNTER — APPOINTMENT (OUTPATIENT)
Dept: PHARMACY | Facility: HOSPITAL | Age: 59
End: 2024-09-26
Payer: COMMERCIAL

## 2024-10-02 ENCOUNTER — TELEMEDICINE CLINICAL SUPPORT (OUTPATIENT)
Dept: NUTRITION | Facility: HOSPITAL | Age: 59
End: 2024-10-02
Payer: COMMERCIAL

## 2024-10-02 VITALS — WEIGHT: 219 LBS | BODY MASS INDEX: 38.79 KG/M2

## 2024-10-02 DIAGNOSIS — E11.9 TYPE 2 DIABETES MELLITUS WITHOUT COMPLICATION, WITHOUT LONG-TERM CURRENT USE OF INSULIN (MULTI): Primary | ICD-10-CM

## 2024-10-02 PROCEDURE — 97803 MED NUTRITION INDIV SUBSEQ: CPT | Performed by: DIETITIAN, REGISTERED

## 2024-10-02 NOTE — PROGRESS NOTES
Nutrition Assessment     Reason for Visit:  Shola Osorio is a 58 y.o. female who presents for nnutrition counseling seeking weight loss.      Anthropometrics:  Wt Readings from Last 10 Encounters:   10/02/24 99.3 kg (219 lb)   09/23/24 98.1 kg (216 lb 3.2 oz)   08/19/24 101 kg (223 lb)   05/13/24 103 kg (227 lb 3.2 oz)   03/25/24 104 kg (230 lb 3.2 oz)   03/11/24 105 kg (231 lb 7.7 oz)   02/15/24 105 kg (232 lb)   12/11/23 107 kg (236 lb)   09/13/23 107 kg (236 lb)   03/30/23 108 kg (237 lb 11.2 oz)       Anthropometrics  Weight: 99.3 kg (219 lb)         Food And Nutrient Intake:  Food and Nutrient History  Food and Nutrient History: Pt presents for nutrition counseling in the context of type II DM.  HgbA1c has been elevated.   Pt was continues on Trulicity and has been losing gradually lose weight as her appetite has been less.  Pt has some life stresors which she feels have been barriers to improving her nutrition choices.  Pt continues to eat more carbohydrates then recommended per pt report.  She feels that she is doing a lot of emotional eating.  Pt is interested in using an jimmy to help  keep herself accountable.  She is particularly interested in psychological based programs to help with using food as a comfort.        Physical Activities:  Physical Activity  Type of Physical Activity: Pt would like to do more trips to the gym.  Suggested she try short HIIT trainings on Youtube.       Nutrition Focused Physical Exam:  Deferred- phone     Energy Needs  Calculated Energy Needs Using Equations  Activity Factor: 1.2  Total Energy Needs: 1867  Estimated Energy Needs  Total Energy Estimated Needs (kCal): 1367 kCal  Method for Estimating Needs: -500 kcals    Nutrition Diagnosis      Nutrition Diagnosis  Patient has Nutrition Diagnosis: Yes  Diagnosis Status (1): Ongoing  Nutrition Diagnosis 1: Excessive carbohydrate intake  Related to (1): type II DM  As Evidenced by (1): diet recall, pt report;  HgbA1C  Additional Nutrition Diagnosis: Diagnosis 2    Nutrition Interventions/Recommendations   Food and Nutrition Delivery  Meals & Snacks: Carbohydrate-modified diet, Energy-modified diet, Fiber-modified diet, General Healthful Diet, Modify schedule of foods/fluids, Protein-modified diet  Goals: 30-45 g carb per meal; 3 meals with 1-2 snacks per day; ~1200 daily kcal intake; ensure adequate protein sources with each meal and snack        Patient Instructions   Aim to track meal intakes 2-3x/week- picking specific days- using an jimmy such as [employer] which provides tips and encouragement.  Pack high protein snacks such as HB eggs, peanut butter and fruit, nuts string cheese, tuna etc to hav on hand at work.        -[email or web address]-high-protein-snacks-that-keep-you-feeling-full-longer/  3. Aim to drink at least 64 oz of water daily  4. Aim for 30 minutes of exercise 5x/week- try walking at lunch or with a coworker.      Nutrition Monitoring and Evaluation   Food/Nutrient Related History Monitoring  Monitoring and Evaluation Plan: Energy intake, Fluid intake, Meal/snack pattern, Protein intake, Carbohydrate intake, Fiber intake  Criteria: consume 1200 kcal/day  Criteria: aim for 64 oz of water daily, limit sugar sweetened beveraged  Criteria: 3 meals per day with 1-2 snacks between meals  Criteria: ensure adequate protein at each meal and snack ~ 20-30 g/meal  Estimated carbohydrate intake: Estimated carbohydrate intake  Criteria: 30-45 g carb per meal  Criteria: ensure adequate fiber is present at meals - focus on 1/2 plate nonstarchy vegetables and choosing whole grain foods when able      Readiness to Change : Good  Level of Understanding : Good  Anticipated Compliant : Good

## 2024-10-02 NOTE — PATIENT INSTRUCTIONS
Aim to track meal intakes 2-3x/week- picking specific days- using an jimmy such as [employer] which provides tips and encouragement.  Pack high protein snacks such as HB eggs, peanut butter and fruit, nuts string cheese, tuna etc to hav on hand at work.        -[email or web address]/10-high-protein-snacks-that-keep-you-feeling-full-longer/  3. Aim to drink at least 64 oz of water daily  4. Aim for 30 minutes of exercise 5x/week- try walking at lunch or with a coworker.

## 2024-10-10 ENCOUNTER — APPOINTMENT (OUTPATIENT)
Dept: PHARMACY | Facility: HOSPITAL | Age: 59
End: 2024-10-10
Payer: COMMERCIAL

## 2024-10-30 ENCOUNTER — APPOINTMENT (OUTPATIENT)
Dept: PHARMACY | Facility: HOSPITAL | Age: 59
End: 2024-10-30
Payer: COMMERCIAL

## 2024-10-30 DIAGNOSIS — E11.69 TYPE 2 DIABETES MELLITUS WITH OTHER SPECIFIED COMPLICATION, WITHOUT LONG-TERM CURRENT USE OF INSULIN: Primary | ICD-10-CM

## 2024-11-04 ENCOUNTER — TELEMEDICINE CLINICAL SUPPORT (OUTPATIENT)
Dept: NUTRITION | Facility: HOSPITAL | Age: 59
End: 2024-11-04
Payer: COMMERCIAL

## 2024-11-04 VITALS — BODY MASS INDEX: 38.97 KG/M2 | WEIGHT: 220 LBS

## 2024-11-04 DIAGNOSIS — E11.9 TYPE 2 DIABETES MELLITUS WITHOUT COMPLICATION, UNSPECIFIED WHETHER LONG TERM INSULIN USE (MULTI): Primary | ICD-10-CM

## 2024-11-04 PROCEDURE — 97803 MED NUTRITION INDIV SUBSEQ: CPT | Performed by: DIETITIAN, REGISTERED

## 2024-11-04 NOTE — PROGRESS NOTES
Nutrition Assessment     Reason for Visit:  Shola Osorio is a 58 y.o. female who presents for follow-up nutrition therapy.      Anthropometrics:    Wt Readings from Last 10 Encounters:   11/04/24 99.8 kg (220 lb)   10/02/24 99.3 kg (219 lb)   09/23/24 98.1 kg (216 lb 3.2 oz)   08/19/24 101 kg (223 lb)   05/13/24 103 kg (227 lb 3.2 oz)   03/25/24 104 kg (230 lb 3.2 oz)   03/11/24 105 kg (231 lb 7.7 oz)   02/15/24 105 kg (232 lb)   12/11/23 107 kg (236 lb)   09/13/23 107 kg (236 lb)        Food And Nutrient Intake:  Food and Nutrient History  Food and Nutrient History: Pt presents for nutrition counseling in the context of type II DM.  HgbA1c has been elevated.   Pt was continues on Trulicity for DM management.  Weight has flucutated but she continues to be down in weight overall.  Pt has made positive changes such as adding in walking and seeing a mental health therapist to keep her on track.  Pt admits to eating too many carbohydrates and fast foods.  She has financial limitations and has not been purchasing higher quality foods. Suggested that pt cooks mor soups and stews with less meat and more legumes and vegetables which can help to stretch her dollar.     Physical Activities:  Physical Activity  Frequency (times/week): 3 (times/week)  Duration (minutes/day): 20 minutes/day  Intensity: walking       Nutrition Focused Physical Exam:  Deferred- phone visit       Energy Needs  Calculated Energy Needs Using Equations  Weight Used for Equation Calculations: 101 kg (223 lb)  Activity Factor: 1.2  Total Energy Needs: 1867  Estimated Energy Needs  Total Energy Estimated Needs (kCal): 1367 kCal  Method for Estimating Needs: MSJ x 1.2-500 kcals/day  Estimated Protein Needs  Total Protein Estimated Needs (g): 68 g  Method for Estimating Needs: 20% of est calories        Nutrition Diagnosis      Nutrition Diagnosis  Patient has Nutrition Diagnosis: Yes  Diagnosis Status (1): Ongoing  Nutrition Diagnosis 1: Excessive  carbohydrate intake  Related to (1): type II DM  As Evidenced by (1): diet recall, pt report; HgbA1C    Nutrition Interventions/Recommendations   Food and Nutrition Delivery  Meals & Snacks: Carbohydrate-modified diet, Energy-modified diet, Fiber-modified diet, General Healthful Diet, Modify schedule of foods/fluids, Protein-modified diet  Goals: 45 g carb per meal; 3 meals with 1-2 snacks per day; ~1200 daily kcal intake; ensure adequate protein sources with each meal and snack  Nutrition Education  Nutrition Education Content: Content related nutrition education, Education on nutrition's influence on health, Physical activity guidance  Goals: Instructed on consistent carbohydrate intake, blood sugar goals, exercise, proper portion sizes, label reading, and general healthful nutrition. Instructed on benefits of wt loss with diabetes and heart health. Both verbal and written education provided in the one-on-one setting.Pt verbalized understanding of information provided. All questions answered to pt satisfaction throughout visit.        Patient Instructions   Consistency of meals is important!  Eat 3 meals per day with snacks.  Eat an hour after waking up and don't go more than 3-4 hours without eating.     - For eating well on a budget check out: https://[email or web address]/strategies-nutrition-budget/  Keep carbohydrates consistent from meal to meal.  Aim for 30-45 g of carbohydrate per meal.  Refer to carbohydrate lists for serving sizes.   Read labels- 15g of total carbohydrate is a serving.     - Choose foods that have fiber listed on the labels.  This helps slow down the release of sugar in your bloodstream.    Include a source of protein with all meals and snack such as meat, fish, nuts, peanut butter, yogurt, cottage cheese or eggs.    -Keep high protein snacks on hand at home such as eggs, nuts, peanut butter, bean dips, tuna and yogurt.   Try to reduce your weight by 1# per week- this can help  lower blood sugar as well.     -  Consider tracking your calorie intake on an jimmy such as [employer], [employer] or [employer] to track your calories.    6.  Drink 64 oz of water other non-caloric beverage.  7.  Aim for 30 minutes of exercise such as walking on most days.        Nutrition Monitoring and Evaluation   Food/Nutrient Related History Monitoring  Monitoring and Evaluation Plan: Energy intake, Fluid intake, Meal/snack pattern, Protein intake, Carbohydrate intake, Fiber intake  Criteria: consume 1200 kcal/day  Criteria: aim for 64 oz of water daily, limit sugar sweetened beveraged  Criteria: 3 meals per day with 1-2 snacks between meals  Criteria: ensure adequate protein at each meal and snack ~ 20-30 g/meal  Estimated carbohydrate intake: Estimated carbohydrate intake  Criteria: 30-45 g carb per meal  Criteria: ensure adequate fiber is present at meals - focus on 1/2 plate nonstarchy vegetables and choosing whole grain foods when able  Knowledge/Beliefs/Attitudes  Monitoring and Evaluation Plan: Physical activity  Criteria: Encouraged regular physical activity including strength training as medically appropriate per AHA guidelines  Body Composition/Growth/Weight History  Monitoring and Evaluation Plan: Weight change  Weight Change: Weight loss  Criteria: 1 lb wt loss per week  Biochemical Data, Medical Tests and Procedures  Monitoring and Evaluation Plan: Glucose/endocrine profile  Glucose/Endocrine Profile: Hemoglobin A1c (HgbA1c), Glucose, fasting  Criteria: A1c <7%; fasting blood glucose  mg/dl    Readiness to Change : Good  Level of Understanding : Good  Anticipated Compliant : Fair

## 2024-11-04 NOTE — PATIENT INSTRUCTIONS
Consistency of meals is important!  Eat 3 meals per day with snacks.  Eat an hour after waking up and don't go more than 3-4 hours without eating.     - For eating well on a budget check out: [email or web address]-nutrition-budget/  Keep carbohydrates consistent from meal to meal.  Aim for 30-45 g of carbohydrate per meal.  Refer to carbohydrate lists for serving sizes.   Read labels- 15g of total carbohydrate is a serving.     - Choose foods that have fiber listed on the labels.  This helps slow down the release of sugar in your bloodstream.    Include a source of protein with all meals and snack such as meat, fish, nuts, peanut butter, yogurt, cottage cheese or eggs.    -Keep high protein snacks on hand at home such as eggs, nuts, peanut butter, bean dips, tuna and yogurt.   Try to reduce your weight by 1# per week- this can help lower blood sugar as well.     -  Consider tracking your calorie intake on an jimmy such as [employer], "[employer] or [employer] to track your calories.    6.  Drink 64 oz of water other non-caloric beverage.  7.  Aim for 30 minutes of exercise such as walking on most days.

## 2024-11-18 ENCOUNTER — LAB (OUTPATIENT)
Dept: LAB | Facility: LAB | Age: 59
End: 2024-11-18
Payer: COMMERCIAL

## 2024-11-18 DIAGNOSIS — E08.00 DIABETES MELLITUS DUE TO UNDERLYING CONDITION WITH HYPEROSMOLARITY WITHOUT COMA, UNSPECIFIED WHETHER LONG TERM INSULIN USE: ICD-10-CM

## 2024-11-18 LAB
EST. AVERAGE GLUCOSE BLD GHB EST-MCNC: 200 MG/DL
HBA1C MFR BLD: 8.6 %

## 2024-11-18 PROCEDURE — 83036 HEMOGLOBIN GLYCOSYLATED A1C: CPT

## 2024-11-18 PROCEDURE — 36415 COLL VENOUS BLD VENIPUNCTURE: CPT

## 2024-11-26 ENCOUNTER — APPOINTMENT (OUTPATIENT)
Dept: PHARMACY | Facility: HOSPITAL | Age: 59
End: 2024-11-26
Payer: COMMERCIAL

## 2024-12-04 ENCOUNTER — TELEMEDICINE (OUTPATIENT)
Dept: PHARMACY | Facility: HOSPITAL | Age: 59
End: 2024-12-04
Payer: COMMERCIAL

## 2024-12-04 DIAGNOSIS — E11.69 TYPE 2 DIABETES MELLITUS WITH OTHER SPECIFIED COMPLICATION, WITHOUT LONG-TERM CURRENT USE OF INSULIN: ICD-10-CM

## 2024-12-04 PROCEDURE — RXMED WILLOW AMBULATORY MEDICATION CHARGE

## 2024-12-04 RX ORDER — BLOOD-GLUCOSE SENSOR
EACH MISCELLANEOUS
Qty: 2 EACH | Refills: 11 | Status: SHIPPED | OUTPATIENT
Start: 2024-12-04

## 2024-12-04 RX ORDER — DULAGLUTIDE 3 MG/.5ML
3 INJECTION, SOLUTION SUBCUTANEOUS
Qty: 2 ML | Refills: 5 | Status: SHIPPED | OUTPATIENT
Start: 2024-12-08

## 2024-12-06 ENCOUNTER — PHARMACY VISIT (OUTPATIENT)
Dept: PHARMACY | Facility: CLINIC | Age: 59
End: 2024-12-06
Payer: COMMERCIAL

## 2024-12-09 ENCOUNTER — TELEMEDICINE CLINICAL SUPPORT (OUTPATIENT)
Dept: NUTRITION | Facility: HOSPITAL | Age: 59
End: 2024-12-09
Payer: COMMERCIAL

## 2024-12-09 DIAGNOSIS — E11.9 TYPE 2 DIABETES MELLITUS WITHOUT COMPLICATION, UNSPECIFIED WHETHER LONG TERM INSULIN USE (MULTI): Primary | ICD-10-CM

## 2024-12-09 PROCEDURE — 97803 MED NUTRITION INDIV SUBSEQ: CPT | Performed by: DIETITIAN, REGISTERED

## 2024-12-09 NOTE — PROGRESS NOTES
Nutrition Assessment     Reason for Visit:  Shola Osorio is a 58 y.o. female who presents for follow-up nutrition visit.      Anthropometrics:     Wt Readings from Last 10 Encounters:   11/04/24 99.8 kg (220 lb)   10/02/24 99.3 kg (219 lb)   09/23/24 98.1 kg (216 lb 3.2 oz)   08/19/24 101 kg (223 lb)   05/13/24 103 kg (227 lb 3.2 oz)   03/25/24 104 kg (230 lb 3.2 oz)   03/11/24 105 kg (231 lb 7.7 oz)   02/15/24 105 kg (232 lb)   12/11/23 107 kg (236 lb)   09/13/23 107 kg (236 lb)     Lab Results   Component Value Date    HGBA1C 8.6 (H) 11/18/2024          Food And Nutrient Intake:  Food and Nutrient History  Food and Nutrient History: Pt presents for follow-up nutrition counseling in the context of type II DM.  HgbA1c has been elevated.   Pt was continues on Trulicity for DM management and notices that it has helped to control her appetite.  She reports that although am BS is still elevated it has improved.  Weight is stable- no significant changes.  Pt has a goal oflowering her weight down to 180#.   Pt is considering starting yoga and continues seeing a mental health therapist to keep her on track.  Pt is struggling with eating sweets.  She has financial limitations and want ot eat healthy within her budget. Suggested she add in a protein food at PM snack to better help her am blood sugars.  Will continue to provide pt with added support as she feels this gives her more accountability.     Food Intake  Meal 1: oatmeal; yogurt        Nutrition Focused Physical Exam:  Deferred- telehealth visit     Energy Needs  Calculated Energy Needs Using Equations  Weight Used for Equation Calculations: 101 kg (223 lb)  Activity Factor: 1.2  Total Energy Needs: 1867  Estimated Energy Needs  Total Energy Estimated Needs (kCal): 1367 kCal  Method for Estimating Needs: MSJ x 1.2-500 kcals/day  Estimated Protein Needs  Total Protein Estimated Needs (g): 68 g  Method for Estimating Needs: 20% of est calories        Nutrition  Diagnosis   Malnutrition Diagnosis  Patient has Malnutrition Diagnosis: No  Nutrition Diagnosis  Patient has Nutrition Diagnosis: Yes  Diagnosis Status (1): Ongoing  Nutrition Diagnosis 1: Excessive carbohydrate intake  Related to (1): type II DM  As Evidenced by (1): diet recall, pt report; HgbA1C    Nutrition Interventions/Recommendations   Food and Nutrition Delivery  Meals & Snacks: Carbohydrate-modified diet, Energy-modified diet, Fiber-modified diet, General Healthful Diet, Modify schedule of foods/fluids, Protein-modified diet  Goals: 45 g carb per meal; 3 meals with 1-2 snacks per day; ~1200 daily kcal intake; ensure adequate protein sources with each meal and snack  Nutrition Education  Nutrition Education Content: Content related nutrition education, Education on nutrition's influence on health, Physical activity guidance  Goals: Instructed on consistent carbohydrate intake, blood sugar goals, exercise, proper portion sizes, and general healthful nutrition. Instructed on benefits of wt loss with diabetes. Both verbal and written education provided in the one-on-one setting.Pt verbalized understanding of information provided. All questions answered to pt satisfaction throughout visit.        There are no Patient Instructions on file for this visit.    Nutrition Monitoring and Evaluation   Food/Nutrient Related History Monitoring  Monitoring and Evaluation Plan: Energy intake, Fluid intake, Meal/snack pattern, Protein intake, Carbohydrate intake, Fiber intake  Criteria: consume 1200 kcal/day  Criteria: aim for 64 oz of water daily, limit sugar sweetened beveraged  Criteria: 3 meals per day with 1-2 snacks between meals  Criteria: ensure adequate protein at each meal and snack ~ 20-30 g/meal  Criteria: 30-45 g carb per meal  Criteria: ensure adequate fiber is present at meals - focus on 1/2 plate nonstarchy vegetables and choosing whole grain foods when able  Knowledge/Beliefs/Attitudes  Monitoring and  Evaluation Plan: Physical activity  Criteria: Encouraged regular physical activity including strength training as medically appropriate per AHA guidelines  Biochemical Data, Medical Tests and Procedures  Monitoring and Evaluation Plan: Glucose/endocrine profile  Glucose/Endocrine Profile: Hemoglobin A1c (HgbA1c), Glucose, fasting  Criteria: A1c <7%; fasting blood glucose  mg/dl      Readiness to Change : Good  Level of Understanding : Good  Anticipated Compliant : Good

## 2024-12-09 NOTE — PATIENT INSTRUCTIONS
Consistency of meals is important!  Eat 3 meals per day with snacks.  Eat an hour after waking up and don't go more than 3-4 hours without eating.    Keep carbohydrates consistent from meal to meal.  Aim for 45 g of carbohydrate per meal.  Refer to carbohydrate lists for serving sizes.   Read labels- 15g of total carbohydrate is a serving.     - Choose foods that have fiber listed on the labels.  This helps slow down the release of sugar in your bloodstream.    Include a source of protein with all meals and snack such as meat, fish, nuts, peanut butter, yogurt, cottage cheese or eggs.    -Include protein with your evening snacks.  Instead of a bowl of cereal, eat yogurt with cereal in it.    Try to reduce your weight by 1# per week- this can help lower blood sugar as well.     - see 1200 calorie sample meal plans for ideas.     - - Consider tracking your calorie intake on an jimmy such as [employer] or [employer]right[employer] to track your calories.    6.  Drink 64 oz of water other non-caloric beverage.  7.  Aim for 30 minutes of exercise such as walking on most days.

## 2024-12-14 PROCEDURE — RXMED WILLOW AMBULATORY MEDICATION CHARGE

## 2024-12-18 DIAGNOSIS — E78.2 MIXED HYPERLIPIDEMIA: ICD-10-CM

## 2024-12-18 DIAGNOSIS — I10 BENIGN ESSENTIAL HYPERTENSION: ICD-10-CM

## 2024-12-21 ENCOUNTER — PHARMACY VISIT (OUTPATIENT)
Dept: PHARMACY | Facility: CLINIC | Age: 59
End: 2024-12-21
Payer: COMMERCIAL

## 2024-12-23 DIAGNOSIS — I10 BENIGN ESSENTIAL HYPERTENSION: ICD-10-CM

## 2024-12-25 RX ORDER — METOPROLOL SUCCINATE 100 MG/1
100 TABLET, EXTENDED RELEASE ORAL DAILY
Qty: 90 TABLET | Refills: 0 | Status: SHIPPED | OUTPATIENT
Start: 2024-12-25

## 2024-12-25 RX ORDER — LOSARTAN POTASSIUM 50 MG/1
50 TABLET ORAL DAILY
Qty: 90 TABLET | Refills: 0 | Status: SHIPPED | OUTPATIENT
Start: 2024-12-25

## 2024-12-28 RX ORDER — ATORVASTATIN CALCIUM 20 MG/1
20 TABLET, FILM COATED ORAL DAILY
Qty: 30 TABLET | Refills: 2 | Status: SHIPPED | OUTPATIENT
Start: 2024-12-28

## 2024-12-28 RX ORDER — AMLODIPINE BESYLATE 10 MG/1
10 TABLET ORAL DAILY
Qty: 30 TABLET | Refills: 2 | Status: SHIPPED | OUTPATIENT
Start: 2024-12-28

## 2025-01-02 ENCOUNTER — APPOINTMENT (OUTPATIENT)
Dept: PHARMACY | Facility: HOSPITAL | Age: 60
End: 2025-01-02
Payer: COMMERCIAL

## 2025-01-03 ENCOUNTER — APPOINTMENT (OUTPATIENT)
Dept: PHARMACY | Facility: HOSPITAL | Age: 60
End: 2025-01-03
Payer: COMMERCIAL

## 2025-01-09 ENCOUNTER — APPOINTMENT (OUTPATIENT)
Dept: PHARMACY | Facility: HOSPITAL | Age: 60
End: 2025-01-09
Payer: COMMERCIAL

## 2025-01-09 DIAGNOSIS — E11.69 TYPE 2 DIABETES MELLITUS WITH OTHER SPECIFIED COMPLICATION, WITHOUT LONG-TERM CURRENT USE OF INSULIN: Primary | ICD-10-CM

## 2025-01-09 NOTE — PROGRESS NOTES
"Pharmacist Clinic: Diabetes Management  Shola Osorio is a 59 y.o. female was referred to Clinical Pharmacy Team for diabetes management.     Referring Provider: Aldo Saravia MD     HISTORY OF PRESENT ILLNESS  Approximate Date of Diagnosis: diagnosed in 2012; historically A1c has never been below 7.9%   Known complications due to diabetes included chronic kidney disease and obesity    Diet:   - used to be in the [facility] program, however she stopped because she was never given feedback   - 3 meals per day   - Patient has been working on eating healthier, however at times it is not that easy if money is tight    LAB REVIEW   Glucose (mg/dL)   Date Value   01/10/2024 223 (H)   10/09/2023 152 (H)   07/05/2023 270 (H)   05/06/2022 274 (H)   08/18/2021 242 (H)     Hemoglobin A1C (%)   Date Value   11/18/2024 8.6 (H)   08/14/2024 8.8 (H)   04/03/2024 8.8 (H)     Bicarbonate (mmol/L)   Date Value   01/10/2024 30   10/09/2023 32   07/05/2023 28   05/06/2022 30   08/18/2021 30     Urea Nitrogen (mg/dL)   Date Value   01/10/2024 14   10/09/2023 16   07/05/2023 16   05/06/2022 13   08/18/2021 11     Creatinine (mg/dL)   Date Value   01/10/2024 0.77   10/09/2023 0.86   07/05/2023 0.97   05/06/2022 0.90   08/18/2021 0.80     Lab Results   Component Value Date    HGBA1C 8.6 (H) 11/18/2024    HGBA1C 8.8 (H) 08/14/2024    HGBA1C 8.8 (H) 04/03/2024     Lab Results   Component Value Date    CHOL 159 04/03/2024    CHOL 165 03/30/2023    CHOL 151 07/29/2022     Lab Results   Component Value Date    HDL 39.0 04/03/2024    HDL 40.4 03/30/2023    HDL 37.6 (A) 07/29/2022     No results found for: \"LDLCALC\"  Lab Results   Component Value Date    TRIG 127 03/30/2023    TRIG 107 07/29/2022    TRIG 122 08/18/2021       DIABETES ASSESSMENT    CURRENT PHARMACOTHERAPY  - metformin 1000 mg twice daily   - farxiga 10 mg once daily   - trulicity 3 mg once weekly (Thursday)     SECONDARY PREVENTION  - Statin? Yes  - ACE-I/ARB? Yes    HISTORICAL " PHARMACOTHERAPY   - glipizide 5 mg twice daily     SMBG MEASUREMENTS:   - 150 range    DISCUSSION:   Discussed opportunity to use CGM to help better understand BG readings throughout the day  Counseled patient on the freestyle yomaira   Answered all questions and concerns   Patient is tolerating trulicity 3 mg once weekly, she reports her appetite is down   Counseled patient, answered all questions and concerns     RECOMMENDATIONS/PLAN  1. Patients diabetes is poorly controlled with most recent A1c of 8.8 % (goal < 7 %).   - Continue all meds under the continuation of care with the referring provider and clinical pharmacy team.   - Schedule with a new PCP, go to your endocrinology appt.     Clinical Pharmacist follow up: 1 month     Thank you,  Mary Ann Goyal, PharmD    Verbal consent to manage patient's drug therapy was obtained from the patient. They were informed they may decline to participate or withdraw from participation in pharmacy services at any time.

## 2025-01-23 ENCOUNTER — APPOINTMENT (OUTPATIENT)
Dept: ENDOCRINOLOGY | Facility: CLINIC | Age: 60
End: 2025-01-23
Payer: COMMERCIAL

## 2025-02-10 ENCOUNTER — TELEMEDICINE (OUTPATIENT)
Dept: PHARMACY | Facility: HOSPITAL | Age: 60
End: 2025-02-10
Payer: COMMERCIAL

## 2025-02-10 ENCOUNTER — APPOINTMENT (OUTPATIENT)
Dept: PRIMARY CARE | Facility: CLINIC | Age: 60
End: 2025-02-10
Payer: COMMERCIAL

## 2025-02-10 DIAGNOSIS — E11.69 TYPE 2 DIABETES MELLITUS WITH OTHER SPECIFIED COMPLICATION, WITHOUT LONG-TERM CURRENT USE OF INSULIN: Primary | ICD-10-CM

## 2025-02-10 NOTE — PROGRESS NOTES
"Pharmacist Clinic: Diabetes Management  Shola Osorio is a 59 y.o. female was referred to Clinical Pharmacy Team for diabetes management.     Referring Provider: Nick Alcantar MD     HISTORY OF PRESENT ILLNESS  Approximate Date of Diagnosis: diagnosed in 2012; historically A1c has never been below 7.9%   Known complications due to diabetes included chronic kidney disease and obesity    Diet:   - used to be in the [facility] program, however she stopped because she was never given feedback   - 3 meals per day   - Patient has been working on eating healthier, however at times it is not that easy if money is tight    LAB REVIEW   Glucose (mg/dL)   Date Value   01/10/2024 223 (H)   10/09/2023 152 (H)   07/05/2023 270 (H)   05/06/2022 274 (H)   08/18/2021 242 (H)     Hemoglobin A1C (%)   Date Value   11/18/2024 8.6 (H)   08/14/2024 8.8 (H)   04/03/2024 8.8 (H)     Bicarbonate (mmol/L)   Date Value   01/10/2024 30   10/09/2023 32   07/05/2023 28   05/06/2022 30   08/18/2021 30     Urea Nitrogen (mg/dL)   Date Value   01/10/2024 14   10/09/2023 16   07/05/2023 16   05/06/2022 13   08/18/2021 11     Creatinine (mg/dL)   Date Value   01/10/2024 0.77   10/09/2023 0.86   07/05/2023 0.97   05/06/2022 0.90   08/18/2021 0.80     Lab Results   Component Value Date    HGBA1C 8.6 (H) 11/18/2024    HGBA1C 8.8 (H) 08/14/2024    HGBA1C 8.8 (H) 04/03/2024     Lab Results   Component Value Date    CHOL 159 04/03/2024    CHOL 165 03/30/2023    CHOL 151 07/29/2022     Lab Results   Component Value Date    HDL 39.0 04/03/2024    HDL 40.4 03/30/2023    HDL 37.6 (A) 07/29/2022     No results found for: \"LDLCALC\"  Lab Results   Component Value Date    TRIG 127 03/30/2023    TRIG 107 07/29/2022    TRIG 122 08/18/2021       DIABETES ASSESSMENT    CURRENT PHARMACOTHERAPY  - metformin 1000 mg twice daily   - farxiga 10 mg once daily   - trulicity 3 mg once weekly (Thursday)     SECONDARY PREVENTION  - Statin? Yes  - ACE-I/ARB? Yes    HISTORICAL " PHARMACOTHERAPY   - glipizide 5 mg twice daily     SMBG MEASUREMENTS:   - 150 range    DISCUSSION:   Discussed opportunity to use CGM to help better understand BG readings throughout the day  Counseled patient on the freestyle yomaira   Answered all questions and concerns   Patient is tolerating trulicity 3 mg once weekly, she reports her appetite is down   Counseled patient, answered all questions and concerns     RECOMMENDATIONS/PLAN  1. Patients diabetes is poorly controlled with most recent A1c of 8.8 % (goal < 7 %).   - Continue all meds under the continuation of care with the referring provider and clinical pharmacy team.   - Schedule with a new PCP, go to your endocrinology appt.     Clinical Pharmacist follow up: as needed     Thank you,  Mary Ann Goyal, PharmD    Verbal consent to manage patient's drug therapy was obtained from the patient. They were informed they may decline to participate or withdraw from participation in pharmacy services at any time.

## 2025-02-11 ENCOUNTER — OFFICE VISIT (OUTPATIENT)
Dept: PRIMARY CARE | Facility: CLINIC | Age: 60
End: 2025-02-11
Payer: COMMERCIAL

## 2025-02-11 ENCOUNTER — APPOINTMENT (OUTPATIENT)
Dept: OPHTHALMOLOGY | Facility: CLINIC | Age: 60
End: 2025-02-11
Payer: COMMERCIAL

## 2025-02-11 VITALS
OXYGEN SATURATION: 95 % | HEART RATE: 91 BPM | WEIGHT: 213.4 LBS | HEIGHT: 63 IN | BODY MASS INDEX: 37.81 KG/M2 | SYSTOLIC BLOOD PRESSURE: 129 MMHG | DIASTOLIC BLOOD PRESSURE: 80 MMHG

## 2025-02-11 DIAGNOSIS — E78.5 DYSLIPIDEMIA: ICD-10-CM

## 2025-02-11 DIAGNOSIS — E11.9 TYPE 2 DIABETES MELLITUS WITHOUT COMPLICATION, UNSPECIFIED WHETHER LONG TERM INSULIN USE (MULTI): ICD-10-CM

## 2025-02-11 DIAGNOSIS — E66.01 CLASS 2 SEVERE OBESITY DUE TO EXCESS CALORIES WITH SERIOUS COMORBIDITY AND BODY MASS INDEX (BMI) OF 37.0 TO 37.9 IN ADULT: ICD-10-CM

## 2025-02-11 DIAGNOSIS — Z13.89 SCREENING FOR OBESITY: ICD-10-CM

## 2025-02-11 DIAGNOSIS — I10 BENIGN ESSENTIAL HYPERTENSION: ICD-10-CM

## 2025-02-11 DIAGNOSIS — G35 MULTIPLE SCLEROSIS (MULTI): ICD-10-CM

## 2025-02-11 DIAGNOSIS — G47.33 OSA (OBSTRUCTIVE SLEEP APNEA): ICD-10-CM

## 2025-02-11 DIAGNOSIS — Z12.31 VISIT FOR SCREENING MAMMOGRAM: ICD-10-CM

## 2025-02-11 DIAGNOSIS — F41.8 ANXIETY WITH DEPRESSION: ICD-10-CM

## 2025-02-11 DIAGNOSIS — Z23 NEED FOR INFLUENZA VACCINATION: ICD-10-CM

## 2025-02-11 DIAGNOSIS — E66.812 CLASS 2 SEVERE OBESITY DUE TO EXCESS CALORIES WITH SERIOUS COMORBIDITY AND BODY MASS INDEX (BMI) OF 37.0 TO 37.9 IN ADULT: ICD-10-CM

## 2025-02-11 DIAGNOSIS — E08.00 DIABETES MELLITUS DUE TO UNDERLYING CONDITION WITH HYPEROSMOLARITY WITHOUT COMA, UNSPECIFIED WHETHER LONG TERM INSULIN USE: ICD-10-CM

## 2025-02-11 DIAGNOSIS — Z76.89 ESTABLISHING CARE WITH NEW DOCTOR, ENCOUNTER FOR: Primary | Chronic | ICD-10-CM

## 2025-02-11 PROCEDURE — 90471 IMMUNIZATION ADMIN: CPT | Performed by: INTERNAL MEDICINE

## 2025-02-11 PROCEDURE — 3079F DIAST BP 80-89 MM HG: CPT | Performed by: INTERNAL MEDICINE

## 2025-02-11 PROCEDURE — 3008F BODY MASS INDEX DOCD: CPT | Performed by: INTERNAL MEDICINE

## 2025-02-11 PROCEDURE — 1036F TOBACCO NON-USER: CPT | Performed by: INTERNAL MEDICINE

## 2025-02-11 PROCEDURE — 4010F ACE/ARB THERAPY RXD/TAKEN: CPT | Performed by: INTERNAL MEDICINE

## 2025-02-11 PROCEDURE — 90656 IIV3 VACC NO PRSV 0.5 ML IM: CPT | Performed by: INTERNAL MEDICINE

## 2025-02-11 PROCEDURE — 3074F SYST BP LT 130 MM HG: CPT | Performed by: INTERNAL MEDICINE

## 2025-02-11 PROCEDURE — 99214 OFFICE O/P EST MOD 30 MIN: CPT | Performed by: INTERNAL MEDICINE

## 2025-02-11 ASSESSMENT — PATIENT HEALTH QUESTIONNAIRE - PHQ9
1. LITTLE INTEREST OR PLEASURE IN DOING THINGS: NOT AT ALL
SUM OF ALL RESPONSES TO PHQ9 QUESTIONS 1 AND 2: 0
2. FEELING DOWN, DEPRESSED OR HOPELESS: NOT AT ALL

## 2025-02-11 ASSESSMENT — ENCOUNTER SYMPTOMS
LOSS OF SENSATION IN FEET: 0
DEPRESSION: 0
CONSTITUTIONAL NEGATIVE: 1
OCCASIONAL FEELINGS OF UNSTEADINESS: 0

## 2025-02-11 NOTE — PROGRESS NOTES
"Patient ID: Shola Osorio is a 59 y.o. female who presents for New Patient Visit ([facility]. ).    /80   Pulse 91   Ht 1.6 m (5' 3\")   Wt 96.8 kg (213 lb 6.4 oz)   SpO2 95%   BMI 37.80 kg/m²     HPI      I have reviewed neurology note  I have reviewed clinical pharmacy note  I have reviewed ophthalmology note          Hypertension on medications controlled  No chest pain, no shortness of breath, no PND, no orthopnea,  No leg swelling, no palpitation, no headache,      Diabetes type 2 without retinopathy  No blurry vision  No double vision  No tingling in feet  No numbness in feet      Patient has multiple sclerosis  She has right lower extremity mild weakness  Did not progress, she had onset of MS almost approximately 10 to 12 years ago  No bladder incontinence  No bowel incontinence  No blurry vision  No double vision  No unsteady gait      Obstructive sleep apnea  Using CPAP        Anxiety with depression stable  Without any medication      Diabetes with hyperlipidemia  On atorvastatin 20 mg      Subjective     Review of Systems   Constitutional: Negative.    All other systems reviewed and are negative.      Objective     Physical Exam  Vitals and nursing note reviewed.   Constitutional:       Appearance: Normal appearance. She is obese.   Neck:      Vascular: No carotid bruit.   Cardiovascular:      Rate and Rhythm: Normal rate and regular rhythm.      Pulses: Normal pulses.      Heart sounds: Normal heart sounds. No murmur heard.  Pulmonary:      Effort: Pulmonary effort is normal.      Breath sounds: Normal breath sounds.   Abdominal:      Palpations: There is no mass.   Musculoskeletal:      Right lower leg: No edema.      Left lower leg: No edema.   Neurological:      General: No focal deficit present.      Mental Status: She is oriented to person, place, and time. Mental status is at baseline.      Comments: Rt lower extremity motor 4/5     Psychiatric:         Mood and Affect: Mood " normal.         Behavior: Behavior normal.         Thought Content: Thought content normal.         Judgment: Judgment normal.         Lab Results   Component Value Date    WBC 7.3 07/05/2023    HGB 12.6 07/05/2023    HCT 43.3 07/05/2023    MCV 75 (L) 07/05/2023     07/05/2023           Problem List Items Addressed This Visit       Benign essential hypertension    Diabetes mellitus (Multi)    Relevant Orders    Comprehensive metabolic panel    Lipid panel    Albumin-Creatinine Ratio, Urine Random    Dyslipidemia    Multiple sclerosis (Multi)     stable         SAUD (obstructive sleep apnea)    Anxiety with depression    Diabetes mellitus due to underlying condition with hyperosmolarity without coma, unspecified whether long term insulin use     stable         Relevant Orders    Comprehensive metabolic panel    Lipid panel    Albumin-Creatinine Ratio, Urine Random    Establishing care with new doctor, encounter for - Primary     Other Visit Diagnoses       Need for influenza vaccination        Relevant Orders    Flu vaccine, trivalent, preservative free, age 6 months and greater (Fluraix/Fluzone/Flulaval) (Completed)    Visit for screening mammogram        Relevant Orders    BI mammo bilateral screening tomosynthesis                A/P        Will get lipid, urine microalbumin, comprehensive metabolic panel  Mammogram will be due on May 2, 2025  Discussed with the patient the effect of morbid obesity and overall all-cause mortality  Discussed with the patient the effect of morbid obesity and physical mental wellbeing  Discussed with the patient the effect of morbid obesity on cardiovascular and cerebrovascular health  Discussed with the patient the effect of morbid obesity on hypertension,diabetes, obstructive sleep apnea fatal arrhythmias and sudden death  Discussed with the patient the effect of morbid obesity and cancer/malignancy, depression, chronic kidney health, chronic low back pain hip pain knee pain  and depression  Advised her to cut back total calories intake and total portion size  Influenza vaccine   Follow-up with me in 4 months time

## 2025-02-12 LAB
ALBUMIN SERPL-MCNC: 4.1 G/DL (ref 3.6–5.1)
ALBUMIN/CREAT UR: NORMAL MG/G CREAT
ALP SERPL-CCNC: 66 U/L (ref 37–153)
ALT SERPL-CCNC: 22 U/L (ref 6–29)
ANION GAP SERPL CALCULATED.4IONS-SCNC: 10 MMOL/L (CALC) (ref 7–17)
AST SERPL-CCNC: 16 U/L (ref 10–35)
BILIRUB SERPL-MCNC: 0.4 MG/DL (ref 0.2–1.2)
BUN SERPL-MCNC: 18 MG/DL (ref 7–25)
CALCIUM SERPL-MCNC: 9.7 MG/DL (ref 8.6–10.4)
CHLORIDE SERPL-SCNC: 99 MMOL/L (ref 98–110)
CHOLEST SERPL-MCNC: 152 MG/DL
CHOLEST/HDLC SERPL: 3.5 (CALC)
CO2 SERPL-SCNC: 30 MMOL/L (ref 20–32)
CREAT SERPL-MCNC: 0.69 MG/DL (ref 0.5–1.05)
CREAT UR-MCNC: 65 MG/DL (ref 20–275)
EGFRCR SERPLBLD CKD-EPI 2021: 99 ML/MIN/1.73M2
GLUCOSE SERPL-MCNC: 148 MG/DL (ref 65–139)
HDLC SERPL-MCNC: 44 MG/DL
LDLC SERPL CALC-MCNC: 88 MG/DL (CALC)
MICROALBUMIN UR-MCNC: <0.2 MG/DL
NONHDLC SERPL-MCNC: 108 MG/DL (CALC)
POTASSIUM SERPL-SCNC: 4.5 MMOL/L (ref 3.5–5.3)
PROT SERPL-MCNC: 7.9 G/DL (ref 6.1–8.1)
SODIUM SERPL-SCNC: 139 MMOL/L (ref 135–146)
TRIGL SERPL-MCNC: 108 MG/DL

## 2025-03-08 PROCEDURE — RXMED WILLOW AMBULATORY MEDICATION CHARGE

## 2025-03-12 ENCOUNTER — PHARMACY VISIT (OUTPATIENT)
Dept: PHARMACY | Facility: CLINIC | Age: 60
End: 2025-03-12
Payer: COMMERCIAL

## 2025-03-12 DIAGNOSIS — I10 BENIGN ESSENTIAL HYPERTENSION: ICD-10-CM

## 2025-03-13 RX ORDER — AMLODIPINE BESYLATE 10 MG/1
10 TABLET ORAL DAILY
Qty: 30 TABLET | Refills: 2 | Status: SHIPPED | OUTPATIENT
Start: 2025-03-13

## 2025-03-27 DIAGNOSIS — E78.2 MIXED HYPERLIPIDEMIA: ICD-10-CM

## 2025-03-31 RX ORDER — ATORVASTATIN CALCIUM 20 MG/1
20 TABLET, FILM COATED ORAL DAILY
Qty: 30 TABLET | Refills: 2 | Status: SHIPPED | OUTPATIENT
Start: 2025-03-31

## 2025-04-03 PROCEDURE — RXMED WILLOW AMBULATORY MEDICATION CHARGE

## 2025-04-04 ENCOUNTER — PHARMACY VISIT (OUTPATIENT)
Dept: PHARMACY | Facility: CLINIC | Age: 60
End: 2025-04-04
Payer: COMMERCIAL

## 2025-04-11 DIAGNOSIS — E11.9 TYPE 2 DIABETES MELLITUS WITHOUT COMPLICATION, WITHOUT LONG-TERM CURRENT USE OF INSULIN: ICD-10-CM

## 2025-04-11 RX ORDER — METFORMIN HYDROCHLORIDE 1000 MG/1
1000 TABLET ORAL
Qty: 60 TABLET | Refills: 2 | Status: SHIPPED | OUTPATIENT
Start: 2025-04-11 | End: 2025-07-10

## 2025-04-24 DIAGNOSIS — I10 BENIGN ESSENTIAL HYPERTENSION: ICD-10-CM

## 2025-04-24 RX ORDER — METOPROLOL SUCCINATE 100 MG/1
100 TABLET, EXTENDED RELEASE ORAL DAILY
Qty: 90 TABLET | Refills: 0 | Status: SHIPPED | OUTPATIENT
Start: 2025-04-24

## 2025-05-06 ENCOUNTER — APPOINTMENT (OUTPATIENT)
Dept: RADIOLOGY | Facility: CLINIC | Age: 60
End: 2025-05-06
Payer: COMMERCIAL

## 2025-05-13 ENCOUNTER — APPOINTMENT (OUTPATIENT)
Dept: RADIOLOGY | Facility: CLINIC | Age: 60
End: 2025-05-13
Payer: COMMERCIAL

## 2025-05-13 VITALS — BODY MASS INDEX: 38.98 KG/M2 | WEIGHT: 220 LBS | HEIGHT: 63 IN

## 2025-05-13 DIAGNOSIS — Z12.31 VISIT FOR SCREENING MAMMOGRAM: ICD-10-CM

## 2025-05-13 PROCEDURE — 77063 BREAST TOMOSYNTHESIS BI: CPT

## 2025-05-13 PROCEDURE — 77067 SCR MAMMO BI INCL CAD: CPT | Performed by: STUDENT IN AN ORGANIZED HEALTH CARE EDUCATION/TRAINING PROGRAM

## 2025-05-13 PROCEDURE — 77063 BREAST TOMOSYNTHESIS BI: CPT | Performed by: STUDENT IN AN ORGANIZED HEALTH CARE EDUCATION/TRAINING PROGRAM

## 2025-05-14 DIAGNOSIS — I10 BENIGN ESSENTIAL HYPERTENSION: ICD-10-CM

## 2025-05-19 RX ORDER — LOSARTAN POTASSIUM 50 MG/1
50 TABLET ORAL DAILY
Qty: 90 TABLET | Refills: 0 | Status: SHIPPED | OUTPATIENT
Start: 2025-05-19

## 2025-06-03 ENCOUNTER — APPOINTMENT (OUTPATIENT)
Dept: ENDOCRINOLOGY | Facility: CLINIC | Age: 60
End: 2025-06-03
Payer: COMMERCIAL

## 2025-06-03 VITALS
WEIGHT: 217.8 LBS | DIASTOLIC BLOOD PRESSURE: 80 MMHG | RESPIRATION RATE: 16 BRPM | BODY MASS INDEX: 38.59 KG/M2 | SYSTOLIC BLOOD PRESSURE: 132 MMHG | HEIGHT: 63 IN | HEART RATE: 76 BPM

## 2025-06-03 DIAGNOSIS — I10 HYPERTENSION, UNSPECIFIED TYPE: ICD-10-CM

## 2025-06-03 DIAGNOSIS — E11.65 INADEQUATELY CONTROLLED DIABETES MELLITUS (MULTI): Primary | ICD-10-CM

## 2025-06-03 DIAGNOSIS — E78.5 HYPERLIPIDEMIA, UNSPECIFIED HYPERLIPIDEMIA TYPE: ICD-10-CM

## 2025-06-03 PROBLEM — R14.0 ABDOMINAL BLOATING: Status: RESOLVED | Noted: 2023-02-16 | Resolved: 2025-06-03

## 2025-06-03 PROBLEM — G47.10 HYPERSOMNIA: Status: RESOLVED | Noted: 2023-02-16 | Resolved: 2025-06-03

## 2025-06-03 PROBLEM — G47.26 CIRCADIAN RHYTHM SLEEP DISORDER, SHIFT WORK TYPE: Status: RESOLVED | Noted: 2023-02-16 | Resolved: 2025-06-03

## 2025-06-03 PROBLEM — R09.81 CONGESTION OF NASAL SINUS: Status: RESOLVED | Noted: 2023-02-16 | Resolved: 2025-06-03

## 2025-06-03 LAB — POC HEMOGLOBIN A1C: 8.1 % (ref 4.2–6.5)

## 2025-06-03 PROCEDURE — 99214 OFFICE O/P EST MOD 30 MIN: CPT | Performed by: INTERNAL MEDICINE

## 2025-06-03 PROCEDURE — 3052F HG A1C>EQUAL 8.0%<EQUAL 9.0%: CPT | Performed by: INTERNAL MEDICINE

## 2025-06-03 PROCEDURE — 4010F ACE/ARB THERAPY RXD/TAKEN: CPT | Performed by: INTERNAL MEDICINE

## 2025-06-03 PROCEDURE — 83036 HEMOGLOBIN GLYCOSYLATED A1C: CPT | Mod: QW | Performed by: INTERNAL MEDICINE

## 2025-06-03 PROCEDURE — 3075F SYST BP GE 130 - 139MM HG: CPT | Performed by: INTERNAL MEDICINE

## 2025-06-03 PROCEDURE — 3079F DIAST BP 80-89 MM HG: CPT | Performed by: INTERNAL MEDICINE

## 2025-06-03 PROCEDURE — 1036F TOBACCO NON-USER: CPT | Performed by: INTERNAL MEDICINE

## 2025-06-03 PROCEDURE — 99204 OFFICE O/P NEW MOD 45 MIN: CPT | Performed by: INTERNAL MEDICINE

## 2025-06-03 PROCEDURE — 3008F BODY MASS INDEX DOCD: CPT | Performed by: INTERNAL MEDICINE

## 2025-06-03 RX ORDER — TIRZEPATIDE 5 MG/.5ML
5 INJECTION, SOLUTION SUBCUTANEOUS
Qty: 2 ML | Refills: 0 | Status: SHIPPED | OUTPATIENT
Start: 2025-06-03

## 2025-06-03 ASSESSMENT — ENCOUNTER SYMPTOMS
FATIGUE: 0
CHILLS: 0
VOMITING: 0
HEADACHES: 0
PALPITATIONS: 0
FEVER: 0
DIARRHEA: 0
SHORTNESS OF BREATH: 0
COUGH: 0
NAUSEA: 0
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
## 2025-06-03 NOTE — ASSESSMENT & PLAN NOTE
59-year-old here for evaluation of uncontrolled type 2 diabetes also with hypertension and hyperlipidemia.  We discussed her course.  We reviewed her numbers and goals.  We did do an A1c in the office that was improved but still elevated at 8.1.  We did discuss medication options.  We will plan to switch her to Mounjaro from Trulicity.  We did discuss potential side effects.  I encouraged her to schedule her eye exam and I will see her back in 4 months.  She is to call or message with concerns or questions

## 2025-06-03 NOTE — PATIENT INSTRUCTIONS
Stop Trulicity and start Mounjaro weekly as discussed  Plan to increase Mounjaro dose monthly as discussed  Keep working on diet and exercise  Be sure to schedule eye exam  Please call or message with any concerns or questions

## 2025-06-10 DIAGNOSIS — I10 BENIGN ESSENTIAL HYPERTENSION: ICD-10-CM

## 2025-06-10 RX ORDER — SPIRONOLACTONE AND HYDROCHLOROTHIAZIDE 25; 25 MG/1; MG/1
1 TABLET ORAL DAILY
Qty: 90 TABLET | Refills: 1 | Status: SHIPPED | OUTPATIENT
Start: 2025-06-10

## 2025-06-17 DIAGNOSIS — Z79.4 TYPE 2 DIABETES MELLITUS WITHOUT COMPLICATION, WITH LONG-TERM CURRENT USE OF INSULIN: ICD-10-CM

## 2025-06-17 DIAGNOSIS — E11.9 TYPE 2 DIABETES MELLITUS WITHOUT COMPLICATION, WITH LONG-TERM CURRENT USE OF INSULIN: ICD-10-CM

## 2025-06-17 RX ORDER — DAPAGLIFLOZIN 10 MG/1
10 TABLET, FILM COATED ORAL EVERY MORNING
Qty: 90 TABLET | Refills: 1 | Status: SHIPPED | OUTPATIENT
Start: 2025-06-17

## 2025-06-23 DIAGNOSIS — I10 BENIGN ESSENTIAL HYPERTENSION: ICD-10-CM

## 2025-06-27 DIAGNOSIS — E78.2 MIXED HYPERLIPIDEMIA: ICD-10-CM

## 2025-06-30 ENCOUNTER — APPOINTMENT (OUTPATIENT)
Dept: PRIMARY CARE | Facility: CLINIC | Age: 60
End: 2025-06-30
Payer: COMMERCIAL

## 2025-06-30 VITALS
DIASTOLIC BLOOD PRESSURE: 75 MMHG | SYSTOLIC BLOOD PRESSURE: 116 MMHG | OXYGEN SATURATION: 97 % | BODY MASS INDEX: 38.3 KG/M2 | WEIGHT: 216.2 LBS | HEART RATE: 84 BPM

## 2025-06-30 DIAGNOSIS — I10 BENIGN ESSENTIAL HYPERTENSION: ICD-10-CM

## 2025-06-30 DIAGNOSIS — G35 MULTIPLE SCLEROSIS (MULTI): ICD-10-CM

## 2025-06-30 DIAGNOSIS — Z13.89 SCREENING FOR OBESITY: ICD-10-CM

## 2025-06-30 DIAGNOSIS — Z13.6 SCREENING, ISCHEMIC HEART DISEASE: ICD-10-CM

## 2025-06-30 DIAGNOSIS — E08.00 DIABETES MELLITUS DUE TO UNDERLYING CONDITION WITH HYPEROSMOLARITY WITHOUT COMA, UNSPECIFIED WHETHER LONG TERM INSULIN USE: ICD-10-CM

## 2025-06-30 DIAGNOSIS — G47.33 OSA (OBSTRUCTIVE SLEEP APNEA): ICD-10-CM

## 2025-06-30 DIAGNOSIS — E66.09 CLASS 2 OBESITY DUE TO EXCESS CALORIES WITHOUT SERIOUS COMORBIDITY WITH BODY MASS INDEX (BMI) OF 38.0 TO 38.9 IN ADULT: ICD-10-CM

## 2025-06-30 DIAGNOSIS — E78.2 MIXED HYPERLIPIDEMIA: ICD-10-CM

## 2025-06-30 DIAGNOSIS — Z78.0 ASYMPTOMATIC MENOPAUSE: ICD-10-CM

## 2025-06-30 DIAGNOSIS — E11.65 INADEQUATELY CONTROLLED DIABETES MELLITUS (MULTI): Primary | ICD-10-CM

## 2025-06-30 DIAGNOSIS — E66.812 CLASS 2 OBESITY DUE TO EXCESS CALORIES WITHOUT SERIOUS COMORBIDITY WITH BODY MASS INDEX (BMI) OF 38.0 TO 38.9 IN ADULT: ICD-10-CM

## 2025-06-30 DIAGNOSIS — Z09 FOLLOW-UP EXAM: Primary | Chronic | ICD-10-CM

## 2025-06-30 PROCEDURE — 3052F HG A1C>EQUAL 8.0%<EQUAL 9.0%: CPT | Performed by: INTERNAL MEDICINE

## 2025-06-30 PROCEDURE — 1036F TOBACCO NON-USER: CPT | Performed by: INTERNAL MEDICINE

## 2025-06-30 PROCEDURE — 3074F SYST BP LT 130 MM HG: CPT | Performed by: INTERNAL MEDICINE

## 2025-06-30 PROCEDURE — 4010F ACE/ARB THERAPY RXD/TAKEN: CPT | Performed by: INTERNAL MEDICINE

## 2025-06-30 PROCEDURE — 3078F DIAST BP <80 MM HG: CPT | Performed by: INTERNAL MEDICINE

## 2025-06-30 PROCEDURE — 99214 OFFICE O/P EST MOD 30 MIN: CPT | Performed by: INTERNAL MEDICINE

## 2025-06-30 RX ORDER — TIRZEPATIDE 7.5 MG/.5ML
7.5 INJECTION, SOLUTION SUBCUTANEOUS
Qty: 2 ML | Refills: 0 | Status: SHIPPED | OUTPATIENT
Start: 2025-06-30

## 2025-06-30 RX ORDER — ASPIRIN 81 MG/1
81 TABLET ORAL DAILY
Qty: 90 TABLET | Refills: 11 | Status: SHIPPED | OUTPATIENT
Start: 2025-06-30 | End: 2026-06-30

## 2025-06-30 ASSESSMENT — PATIENT HEALTH QUESTIONNAIRE - PHQ9
1. LITTLE INTEREST OR PLEASURE IN DOING THINGS: NOT AT ALL
2. FEELING DOWN, DEPRESSED OR HOPELESS: NOT AT ALL
1. LITTLE INTEREST OR PLEASURE IN DOING THINGS: NOT AT ALL
SUM OF ALL RESPONSES TO PHQ9 QUESTIONS 1 AND 2: 0
SUM OF ALL RESPONSES TO PHQ9 QUESTIONS 1 AND 2: 0
2. FEELING DOWN, DEPRESSED OR HOPELESS: NOT AT ALL
2. FEELING DOWN, DEPRESSED OR HOPELESS: NOT AT ALL
SUM OF ALL RESPONSES TO PHQ9 QUESTIONS 1 AND 2: 0
1. LITTLE INTEREST OR PLEASURE IN DOING THINGS: NOT AT ALL

## 2025-06-30 ASSESSMENT — ENCOUNTER SYMPTOMS
LOSS OF SENSATION IN FEET: 1
CONSTITUTIONAL NEGATIVE: 1
DEPRESSION: 0
OCCASIONAL FEELINGS OF UNSTEADINESS: 1

## 2025-06-30 NOTE — PROGRESS NOTES
Patient ID: Shola Osorio is a 59 y.o. female who presents for Follow-up (fuv).    /75 (BP Location: Left arm, Patient Position: Sitting, BP Cuff Size: Large adult)   Pulse 84   Wt 98.1 kg (216 lb 3.2 oz)   SpO2 97%   BMI 38.30 kg/m²     HPI        T2 DM UNCONTROLLED   SEEN ENDOCRINOLOGY DR MCCORMICK   OFFICE A1C 8.1  ON FARXIGA 10MG   STARTED ON MOUNJARO  5MG SUBCUTANEOUSLY   NO TINGLING IN FEET   NO NUMBNESS IN FEET   NO BLURRY VISION   NO DOUBLE VISION         HTN ON MEDICATIONS CONTROLLED   NO CP, NO SOB , NO PND, NO ORTHOPNEA  NO LEG SWELLING , NO PALPITATION , NO HEADACHE         MORBIDLY OBESE   NO APNEA, NO SNORING   NO GASPING OR CHOKING FORT AIR AT NIGJHT   NO DAYTIME SOMNOLENCVE   NO AM FATIGUE , NO AM HEADACHE           Subjective     Review of Systems   Constitutional: Negative.    All other systems reviewed and are negative.      Objective     Physical Exam  Vitals and nursing note reviewed.   Constitutional:       Appearance: Normal appearance. She is obese.   Neck:      Vascular: No carotid bruit.   Cardiovascular:      Rate and Rhythm: Normal rate and regular rhythm.      Pulses: Normal pulses.      Heart sounds: Normal heart sounds. No murmur heard.  Pulmonary:      Effort: Pulmonary effort is normal.      Breath sounds: Normal breath sounds.   Abdominal:      Palpations: There is no mass.   Musculoskeletal:      Right lower leg: No edema.      Left lower leg: No edema.   Skin:     Capillary Refill: Capillary refill takes more than 3 seconds.   Neurological:      General: No focal deficit present.      Mental Status: She is oriented to person, place, and time. Mental status is at baseline.   Psychiatric:         Mood and Affect: Mood normal.         Behavior: Behavior normal.         Thought Content: Thought content normal.         Judgment: Judgment normal.         Lab Results   Component Value Date    WBC 7.3 07/05/2023    HGB 12.6 07/05/2023    HCT 43.3 07/05/2023    MCV 75 (L)  07/05/2023     07/05/2023           Problem List Items Addressed This Visit       Benign essential hypertension    Multiple sclerosis (Multi)    SAUD (obstructive sleep apnea)    Hyperlipidemia    Diabetes mellitus due to underlying condition with hyperosmolarity without coma, unspecified whether long term insulin use    Relevant Medications    aspirin 81 mg EC tablet    Screening for obesity    Class 2 obesity due to excess calories without serious comorbidity with body mass index (BMI) of 38.0 to 38.9 in adult    Follow-up exam - Primary     Other Visit Diagnoses         Screening, ischemic heart disease          Asymptomatic menopause        Relevant Orders    XR DEXA bone density                  A/P           DEXA SCAN   CONTINUE ALL THE MEDICATIONS   Discussed with the patient the effect of morbid obesity and overall all-cause mortality  Discussed with the patient the effect of morbid obesity and physical mental wellbeing  Discussed with the patient the effect of morbid obesity and cardiovascular cerebrovascular health  Discussed with the patient the effect of morbid obesity and hypertension, diabetes, obstructive sleep apnea fatal arrhythmias and sudden death  Advised her to cut back total calories intake and total portion size  Follow-up in 6 months time

## 2025-07-19 DIAGNOSIS — E11.65 INADEQUATELY CONTROLLED DIABETES MELLITUS (MULTI): ICD-10-CM

## 2025-07-20 RX ORDER — TIRZEPATIDE 10 MG/.5ML
10 INJECTION, SOLUTION SUBCUTANEOUS
Qty: 2 ML | Refills: 0 | Status: SHIPPED | OUTPATIENT
Start: 2025-07-20 | End: 2026-07-20

## 2025-07-23 DIAGNOSIS — I10 BENIGN ESSENTIAL HYPERTENSION: ICD-10-CM

## 2025-07-23 RX ORDER — AMLODIPINE BESYLATE 10 MG/1
10 TABLET ORAL DAILY
Qty: 90 TABLET | Refills: 1 | Status: SHIPPED | OUTPATIENT
Start: 2025-07-23

## 2025-07-24 ENCOUNTER — HOSPITAL ENCOUNTER (OUTPATIENT)
Dept: RADIOLOGY | Facility: CLINIC | Age: 60
Discharge: HOME | End: 2025-07-24
Payer: COMMERCIAL

## 2025-07-24 ENCOUNTER — APPOINTMENT (OUTPATIENT)
Dept: RADIOLOGY | Facility: CLINIC | Age: 60
End: 2025-07-24
Payer: COMMERCIAL

## 2025-07-24 DIAGNOSIS — Z78.0 ASYMPTOMATIC MENOPAUSE: ICD-10-CM

## 2025-07-24 PROCEDURE — 77080 DXA BONE DENSITY AXIAL: CPT

## 2025-07-24 PROCEDURE — 77080 DXA BONE DENSITY AXIAL: CPT | Performed by: RADIOLOGY

## 2025-07-26 DIAGNOSIS — I10 BENIGN ESSENTIAL HYPERTENSION: ICD-10-CM

## 2025-07-28 RX ORDER — METOPROLOL SUCCINATE 100 MG/1
100 TABLET, EXTENDED RELEASE ORAL DAILY
Qty: 90 TABLET | Refills: 0 | Status: SHIPPED | OUTPATIENT
Start: 2025-07-28

## 2025-07-30 ENCOUNTER — SPECIALTY PHARMACY (OUTPATIENT)
Dept: PHARMACY | Facility: CLINIC | Age: 60
End: 2025-07-30

## 2025-07-31 PROCEDURE — RXMED WILLOW AMBULATORY MEDICATION CHARGE

## 2025-08-05 ENCOUNTER — PHARMACY VISIT (OUTPATIENT)
Dept: PHARMACY | Facility: CLINIC | Age: 60
End: 2025-08-05
Payer: COMMERCIAL

## 2025-08-05 DIAGNOSIS — E11.69 TYPE 2 DIABETES MELLITUS WITH OTHER SPECIFIED COMPLICATION, WITHOUT LONG-TERM CURRENT USE OF INSULIN: Primary | ICD-10-CM

## 2025-08-05 DIAGNOSIS — E11.9 TYPE 2 DIABETES MELLITUS WITHOUT COMPLICATION, WITHOUT LONG-TERM CURRENT USE OF INSULIN: ICD-10-CM

## 2025-08-05 DIAGNOSIS — I10 BENIGN ESSENTIAL HYPERTENSION: ICD-10-CM

## 2025-08-05 RX ORDER — METFORMIN HYDROCHLORIDE 1000 MG/1
1000 TABLET ORAL
Qty: 180 TABLET | Refills: 1 | Status: SHIPPED | OUTPATIENT
Start: 2025-08-05 | End: 2025-11-03

## 2025-08-05 RX ORDER — LOSARTAN POTASSIUM 50 MG/1
50 TABLET ORAL DAILY
Qty: 90 TABLET | Refills: 1 | Status: SHIPPED | OUTPATIENT
Start: 2025-08-05

## 2025-08-05 RX ORDER — METOPROLOL SUCCINATE 100 MG/1
100 TABLET, EXTENDED RELEASE ORAL DAILY
Qty: 90 TABLET | Refills: 1 | Status: SHIPPED | OUTPATIENT
Start: 2025-08-05

## 2025-08-13 ENCOUNTER — TELEPHONE (OUTPATIENT)
Dept: PRIMARY CARE | Facility: CLINIC | Age: 60
End: 2025-08-13
Payer: COMMERCIAL

## 2025-08-13 DIAGNOSIS — E78.2 MIXED HYPERLIPIDEMIA: ICD-10-CM

## 2025-08-13 RX ORDER — ATORVASTATIN CALCIUM 20 MG/1
20 TABLET, FILM COATED ORAL DAILY
Qty: 30 TABLET | Refills: 2 | Status: SHIPPED | OUTPATIENT
Start: 2025-08-13

## 2025-08-15 LAB
EST. AVERAGE GLUCOSE BLD GHB EST-MCNC: 203 MG/DL
EST. AVERAGE GLUCOSE BLD GHB EST-SCNC: 11.2 MMOL/L
HBA1C MFR BLD: 8.7 %

## 2025-08-21 RX ORDER — AMLODIPINE BESYLATE 10 MG/1
10 TABLET ORAL DAILY
Qty: 90 TABLET | Refills: 1 | Status: SHIPPED | OUTPATIENT
Start: 2025-08-21

## 2025-08-21 RX ORDER — ATORVASTATIN CALCIUM 20 MG/1
20 TABLET, FILM COATED ORAL DAILY
Qty: 30 TABLET | Refills: 2 | Status: SHIPPED | OUTPATIENT
Start: 2025-08-21